# Patient Record
Sex: FEMALE | Race: WHITE | NOT HISPANIC OR LATINO | Employment: FULL TIME | URBAN - METROPOLITAN AREA
[De-identification: names, ages, dates, MRNs, and addresses within clinical notes are randomized per-mention and may not be internally consistent; named-entity substitution may affect disease eponyms.]

---

## 2022-09-30 ENCOUNTER — ANNUAL EXAM (OUTPATIENT)
Dept: OBGYN CLINIC | Facility: CLINIC | Age: 52
End: 2022-09-30

## 2022-09-30 VITALS
BODY MASS INDEX: 32.52 KG/M2 | HEIGHT: 65 IN | WEIGHT: 195.2 LBS | DIASTOLIC BLOOD PRESSURE: 68 MMHG | SYSTOLIC BLOOD PRESSURE: 108 MMHG

## 2022-09-30 DIAGNOSIS — Z12.31 ENCOUNTER FOR SCREENING MAMMOGRAM FOR MALIGNANT NEOPLASM OF BREAST: ICD-10-CM

## 2022-09-30 DIAGNOSIS — Z01.419 GYNECOLOGIC EXAM NORMAL: Primary | ICD-10-CM

## 2022-09-30 PROBLEM — N73.6 PELVIC PERITONEAL ADHESIONS, FEMALE: Status: ACTIVE | Noted: 2022-09-30

## 2022-09-30 PROBLEM — Z52.4 DONOR OF KIDNEY FOR TRANSPLANT: Status: ACTIVE | Noted: 2021-09-15

## 2022-09-30 PROBLEM — E03.4 HYPOTHYROIDISM DUE TO ACQUIRED ATROPHY OF THYROID: Status: ACTIVE | Noted: 2020-07-10

## 2022-09-30 PROBLEM — Z98.891 H/O CESAREAN SECTION: Status: ACTIVE | Noted: 2022-02-07

## 2022-09-30 PROBLEM — N94.9 SYMPTOM ASSOCIATED WITH FEMALE GENITAL ORGANS: Status: ACTIVE | Noted: 2022-09-30

## 2022-09-30 RX ORDER — CLONAZEPAM 0.5 MG/1
TABLET ORAL
COMMUNITY
Start: 2022-08-16

## 2022-09-30 RX ORDER — CITALOPRAM 20 MG/1
TABLET ORAL
COMMUNITY
Start: 2022-08-04

## 2022-09-30 RX ORDER — LEVOTHYROXINE SODIUM 50 MCG
TABLET ORAL
COMMUNITY
Start: 2022-08-26

## 2022-09-30 RX ORDER — CITALOPRAM 10 MG/1
TABLET ORAL
COMMUNITY
Start: 2022-07-26

## 2022-09-30 NOTE — PROGRESS NOTES
Assessment/Plan   Problem List Items Addressed This Visit        Other    Gynecologic exam normal - Primary     Pap guidelines reviewed  Pap not indicated in this low risk patient s/p benign hysterectomy  Continue yearly mammograms, script given  Return to office for annual or as needed  Other Visit Diagnoses     Encounter for screening mammogram for malignant neoplasm of breast        Relevant Orders    Mammo screening bilateral w 3d & cad          Subjective:     Patient ID: Violette Martinez is a 46 y o  y o  female  HPI  45 yo seen for annual exam  S/p hysterectomy 2014 for pelvic pain, had significant scar tissue from   Reports left ovary remains  Denies bowel or bladder issues  Last pap: Always normal    Last mammogram: 2021 BIRADS 1: Negative  The following portions of the patient's history were reviewed and updated as appropriate:   She  has a past medical history of Anxiety, Basal cell carcinoma (BCC) of skin of upper extremity including shoulder, Depression, Gastric polyps, and Hashimoto's disease  She   Patient Active Problem List    Diagnosis Date Noted    Pelvic peritoneal adhesions, female 2022    Symptom associated with female genital organs 2022    Gynecologic exam normal 2022    H/O  section 2022    Donor of kidney for transplant 09/15/2021    Hypothyroidism due to acquired atrophy of thyroid 07/10/2020     She  has a past surgical history that includes Hysterectomy; AUGMENTATION BREAST;  section; Excision basal cell carcinoma (Bilateral); Carpal tunnel release; Debridement tennis elbow; Nephrectomy living donor (Right, 2022); and Right oophorectomy (Right)  Her family history includes Breast cancer (age of onset: 52) in her sister; Coronary artery disease in her father; Diabetes in her paternal grandfather; Hyperlipidemia in her father; Hypertension in her father  She  reports that she has quit smoking   She has never used smokeless tobacco  She reports current alcohol use  She reports that she does not use drugs  Current Outpatient Medications   Medication Sig Dispense Refill    citalopram (CeleXA) 10 mg tablet TAKE 1 TABLET BY MOUTH EVERY DAY WITH 20MG      Synthroid 50 MCG tablet TAKE 1 TABLET BY MOUTH IN THE AM 5 DAYS A WEEK AND 2 TABS 2 DAYS A WEEK  **9 TABS TOTAL A WEEK**      citalopram (CeleXA) 20 mg tablet TAKE 1 TABLET BY MOUTH ONCE DAILY WITH 10MG TABLET      clonazePAM (KlonoPIN) 0 5 mg tablet TAKE 1 TABLET BY MOUTH EVERY DAY AS NEEDED FOR SLEEP       No current facility-administered medications for this visit  She is allergic to sulfamethoxazole, trimethoprim, amoxicillin, bactrim [sulfamethoxazole-trimethoprim], and penicillins       Menstrual History:  OB History        3    Para   2    Term   1            AB   1    Living   2       SAB        IAB        Ectopic        Multiple        Live Births   2                No LMP recorded (lmp unknown)  Patient has had a hysterectomy  Review of Systems   Constitutional: Negative for fatigue, fever and unexpected weight change  HENT: Negative for dental problem and sinus pressure  Eyes: Negative for visual disturbance  Respiratory: Negative for cough, shortness of breath and wheezing  Cardiovascular: Negative for chest pain  Gastrointestinal: Negative for abdominal pain, blood in stool, constipation, diarrhea, nausea and vomiting  Endocrine: Negative for polydipsia  Genitourinary: Negative for difficulty urinating, dyspareunia, dysuria, frequency, hematuria, pelvic pain and urgency  Musculoskeletal: Negative for arthralgias and back pain  Neurological: Negative for dizziness, seizures, light-headedness and headaches  Psychiatric/Behavioral: Negative for suicidal ideas  The patient is not nervous/anxious          Objective:  Vitals:    22 0757   BP: 108/68   BP Location: Left arm   Patient Position: Sitting Cuff Size: Standard   Weight: 88 5 kg (195 lb 3 2 oz)   Height: 5' 5 25" (1 657 m)      Physical Exam  Constitutional:       Appearance: Normal appearance  She is well-developed  Genitourinary:      Vulva and bladder normal       No lesions in the vagina  Right Labia: No rash, tenderness, lesions or skin changes  Left Labia: No tenderness, lesions, skin changes or rash  No labial fusion noted  No inguinal adenopathy present in the right or left side  No vaginal discharge, erythema, tenderness or bleeding  Right Adnexa: absent  Left Adnexa: not tender, not full and no mass present  Cervix is absent  Uterus is absent  No urethral prolapse, tenderness or mass present  Bladder is not tender  Breasts: Breasts are symmetrical       Right: No swelling, bleeding, inverted nipple, mass, nipple discharge, skin change, tenderness, axillary adenopathy or supraclavicular adenopathy  Left: No swelling, bleeding, inverted nipple, mass, nipple discharge, skin change, tenderness, axillary adenopathy or supraclavicular adenopathy  HENT:      Head: Normocephalic and atraumatic  Neck:      Thyroid: No thyromegaly  Cardiovascular:      Rate and Rhythm: Normal rate and regular rhythm  Heart sounds: Normal heart sounds  No murmur heard  No friction rub  No gallop  Pulmonary:      Effort: Pulmonary effort is normal  No respiratory distress  Breath sounds: Normal breath sounds  No wheezing  Abdominal:      General: There is no distension  Palpations: Abdomen is soft  There is no mass  Tenderness: There is no abdominal tenderness  There is no guarding or rebound  Hernia: No hernia is present  Lymphadenopathy:      Cervical: No cervical adenopathy  Upper Body:      Right upper body: No supraclavicular, axillary or pectoral adenopathy  Left upper body: No supraclavicular, axillary or pectoral adenopathy        Lower Body: No right inguinal adenopathy  No left inguinal adenopathy  Neurological:      Mental Status: She is alert and oriented to person, place, and time  Skin:     General: Skin is warm and dry     Psychiatric:         Behavior: Behavior normal

## 2022-10-05 NOTE — ASSESSMENT & PLAN NOTE
Pap guidelines reviewed  Pap not indicated in this low risk patient s/p benign hysterectomy  Continue yearly mammograms, script given  Return to office for annual or as needed 
Poor

## 2022-11-23 ENCOUNTER — HOSPITAL ENCOUNTER (OUTPATIENT)
Dept: MAMMOGRAPHY | Facility: IMAGING CENTER | Age: 52
Discharge: HOME | End: 2022-11-23

## 2022-11-23 VITALS — HEIGHT: 65 IN | BODY MASS INDEX: 32.49 KG/M2 | WEIGHT: 195 LBS

## 2022-11-23 DIAGNOSIS — Z12.31 ENCOUNTER FOR SCREENING MAMMOGRAM FOR MALIGNANT NEOPLASM OF BREAST: ICD-10-CM

## 2022-11-29 PROBLEM — Z01.419 GYNECOLOGIC EXAM NORMAL: Status: RESOLVED | Noted: 2022-09-30 | Resolved: 2022-11-29

## 2023-11-07 ENCOUNTER — HOSPITAL ENCOUNTER (OUTPATIENT)
Dept: MAMMOGRAPHY | Facility: IMAGING CENTER | Age: 53
Discharge: HOME | End: 2023-11-07
Payer: COMMERCIAL

## 2023-11-07 VITALS — HEIGHT: 65 IN | BODY MASS INDEX: 26.66 KG/M2 | WEIGHT: 160 LBS

## 2023-11-07 DIAGNOSIS — Z12.31 ENCOUNTER FOR SCREENING MAMMOGRAM FOR MALIGNANT NEOPLASM OF BREAST: ICD-10-CM

## 2023-11-07 PROCEDURE — 77063 BREAST TOMOSYNTHESIS BI: CPT

## 2023-11-07 PROCEDURE — 77067 SCR MAMMO BI INCL CAD: CPT

## 2023-11-14 ENCOUNTER — ANNUAL EXAM (OUTPATIENT)
Dept: OBGYN CLINIC | Facility: CLINIC | Age: 53
End: 2023-11-14
Payer: COMMERCIAL

## 2023-11-14 ENCOUNTER — TELEPHONE (OUTPATIENT)
Dept: ADMINISTRATIVE | Facility: OTHER | Age: 53
End: 2023-11-14

## 2023-11-14 VITALS
DIASTOLIC BLOOD PRESSURE: 76 MMHG | WEIGHT: 167.6 LBS | HEIGHT: 65 IN | BODY MASS INDEX: 27.92 KG/M2 | SYSTOLIC BLOOD PRESSURE: 108 MMHG

## 2023-11-14 DIAGNOSIS — Z12.31 ENCOUNTER FOR SCREENING MAMMOGRAM FOR MALIGNANT NEOPLASM OF BREAST: ICD-10-CM

## 2023-11-14 DIAGNOSIS — Z01.419 GYNECOLOGIC EXAM NORMAL: Primary | ICD-10-CM

## 2023-11-14 PROBLEM — E07.9 DISEASE OF THYROID GLAND: Status: ACTIVE | Noted: 2023-11-14

## 2023-11-14 PROBLEM — R12 HEARTBURN: Status: ACTIVE | Noted: 2023-01-24

## 2023-11-14 PROBLEM — Z90.5 H/O RIGHT NEPHRECTOMY: Status: ACTIVE | Noted: 2023-01-24

## 2023-11-14 PROBLEM — L29.0 PERIANAL IRRITATION: Status: ACTIVE | Noted: 2023-01-24

## 2023-11-14 PROBLEM — K21.9 GERD (GASTROESOPHAGEAL REFLUX DISEASE): Status: ACTIVE | Noted: 2023-11-14

## 2023-11-14 PROCEDURE — 99396 PREV VISIT EST AGE 40-64: CPT | Performed by: PHYSICIAN ASSISTANT

## 2023-11-14 NOTE — TELEPHONE ENCOUNTER
----- Message from Ravi Adair PA-C sent at 11/14/2023  8:52 AM EST -----  Regarding: colonoscopy  11/14/23 8:52 AM     Hello, our patient Jonathan Perez had a Colonoscopy  completed/performed. Please assist in updating the patient chart by making an External outreach to [facility], done by Dr. Todd Ann.       Thank you,   Ravi Adair PA-C   [address] [place] [address] OB/GYN [facility]

## 2023-11-14 NOTE — LETTER
DATE OF PROCEDURE:  01/26/2019    SURGEON:  Antonino Flores M.D.    PREOPERATIVE DIAGNOSES:  The patient is a 42-year-old female with iron  deficiency anemia on iron replacement therapy.  She does have intermittent  menorrhagia.  She has constipation with a bowel movement every 2 days and  presents for colorectal cancer screening.     POSTOPERATIVE DIAGNOSES:    1. A 4 mm flat mucosal distal transverse colon polyp, status post cold biopsy  polypectomy.   2. A 3 mm sessile rectal polyp, status post cold biopsy polypectomy.  3. A few scattered diverticula within the sigmoid colon.  4. Grade 1 internal hemorrhoids.  5. Poor colonic prep precluded more thorough examination.  6. Otherwise, unremarkable colonoscopy of the visualized segments to the  terminal ileum.     PROCEDURE PERFORMED:  Colonoscopy with biopsies.    INSTRUMENT USED:  e-channel video colonoscope.    SEDATION:  Per Anesthesia.    DESCRIPTION OF PROCEDURE:  Informed consent had been obtained prior to the  procedure with the patient was aware of potential complications of bleeding,  perforation, aspiration, and infection.  Continuous pulse oximetry and blood  pressure monitoring were obtained throughout the course of the procedure.  The  patient was brought to the endoscopy suite and after her EGD was completed, was  placed in the left lateral decubitus position.  After conscious sedation had  been administered by Anesthesia, video colonoscope was inserted rectally and  under direct visualization, was advanced to the cecum without difficulty.  The  cecum was identified by the ileocecal valve, cecal strap, transillumination,  and the appendiceal orifice.  The ileocecal valve was then intubated and  colonoscope was advanced approximately 5 cm into the terminal ileum.  The  terminal ileal um mucosa was unremarkable colonoscopically and the instrument  was then withdrawn back into the cecum whereupon it was slowly withdrawn with  careful inspection of the  Procedure Request Form: Colonoscopy      Date Requested: 23  Patient: Bradly Huizar  Patient : 1970   Referring Provider: Kishor Mojica, DO        Date of Procedure ______________________________       The above patient has informed us that they have completed their   most recent Colonoscopy at your facility. Please complete   this form and attach all corresponding procedure reports/results. Comments __________________________________________________________  ____________________________________________________________________  ____________________________________________________________________  ____________________________________________________________________    Facility Completing Procedure _________________________________________    Form Completed By (print name) _______________________________________      Signature __________________________________________________________      These reports are needed for  compliance. Please fax this completed form and a copy of the procedure report to our office located at [address] [place] [address] as soon as possible to Fax [phone] camilo Torres: Phone [phone]    We thank you for your assistance in treating our mutual patient. individual colonic segments.  A 4 mm flat mucosal  distal transverse colon polyp was identified and a 3 mm sessile rectal polyp  was identified.  Both polyps were excised using cold biopsy forceps with  adequate hemostasis post polypectomies.  A few scattered small diverticula were  noted within the sigmoid colon.  Retroflexed examination within the rectum  revealed grade 1 internal hemorrhoids.  The overall quality of the colonic prep  was poor due to a large amount of liquid and semisolid stool throughout the  colon.  The instruments were then withdrawn from the patient who tolerated the  procedure and there were no apparent complications.  Postoperatively, the  patient had active bowel sounds, soft abdomen, flatus, normal vital signs.  The  estimated time withdrawal was 12 minutes.     IMPRESSION:  The patient was found to have 2 colon polyps, which were excised  as detailed above.  Additionally, she had diverticulosis and internal  hemorrhoids.  A poor colonic prep precluded a more thorough examination.     RECOMMENDATIONS:    1. Follow up polypectomy results.  2. Recommend a followup colonoscopy in 1 year with a 2-day bowel preparation  given an inadequate bowel preparation noted today.   3. Diverticulosis diet and precautions.  4. High-fiber diet with adequate daily hydration.  5. Refer to EGD recommendations.  6. Follow up in the office within 2 weeks or sooner as necessary.        DATE AND TIME SANDRA Hernandez/CLARA-#[id number]  DD:  01/26/2019 08:44:59      DT:  01/26/2019 10:27:46    cc:           Fei Yanez M.D.        [facility]                      MRN#: [id number]  ROOM:      [facility]#: [id number]  REPORT OF OPERATION

## 2023-11-14 NOTE — TELEPHONE ENCOUNTER
Upon review of the In Basket request we were able to locate, review, and update the patient chart as requested for CRC: Colonoscopy. Any additional questions or concerns should be emailed to the Practice Liaisons via the appropriate education email address, please do not reply via In Basket. Thank you  Abram Milton MA       Upon review of the In Basket request and the patient's chart, initial outreach has been made via fax to facility. Please see Contacts section for details.      Thank you  Abram Milton MA

## 2023-11-14 NOTE — PATIENT INSTRUCTIONS
There are some other supplements to help with night sweats and hot flashes including Evening Primrose oil, Black Cohosh, Vitamin E. Unfortunately there are limited studies on these supplements and the studies we do have show that there is no benefit when compared to placebo group but I do have patient's there swear by them. We have certainly found that dietary modifications such as avoiding caffeine, alcohol, spicy foods can help decrease intensity of hot flashes and are often triggers for hot flashes and night sweats. Some women also find that increasing plant based estrogens in their diet such as isoflavones (soy, chickpeas, lentils, flaxseed, grains, beans, fruits and vegetables) can help as well. Weight loss can reduce symptoms too.

## 2023-11-14 NOTE — ASSESSMENT & PLAN NOTE
Pap guidelines reviewed. Pap no longer indicated in this patient s/p benign hysterectomy. Continue yearly mammograms. Will request colonoscopy records. Follows with PCP for routine labs. There are some other supplements to help with night sweats and hot flashes including Evening Primrose oil, Black Cohosh, Vitamin E. Unfortunately there are limited studies on these supplements and the studies we do have show that there is no benefit when compared to placebo group. Reviewed dietary modifications such as avoiding caffeine, alcohol, spicy foods can help decrease intensity of hot flashes and are often triggers for hot flashes and night sweats. Some women also find that increasing plant based estrogens in their diet such as isoflavones (soy, chickpeas, lentils, flaxseed, grains, beans, fruits and vegetables) can help as well. Weight loss can reduce symptoms too. Recommend Calcium 1200mg in two divided doses. Vitamin D3 2,000-4,000 IU daily   Weight bearing exercises 3-4x's a week for 30-40min. Return to office for annual or as needed.

## 2023-11-14 NOTE — PROGRESS NOTES
Assessment/Plan   Problem List Items Addressed This Visit          Other    Gynecologic exam normal - Primary     Pap guidelines reviewed. Pap no longer indicated in this patient s/p benign hysterectomy. Continue yearly mammograms. Will request colonoscopy records. Follows with PCP for routine labs. There are some other supplements to help with night sweats and hot flashes including Evening Primrose oil, Black Cohosh, Vitamin E. Unfortunately there are limited studies on these supplements and the studies we do have show that there is no benefit when compared to placebo group. Reviewed dietary modifications such as avoiding caffeine, alcohol, spicy foods can help decrease intensity of hot flashes and are often triggers for hot flashes and night sweats. Some women also find that increasing plant based estrogens in their diet such as isoflavones (soy, chickpeas, lentils, flaxseed, grains, beans, fruits and vegetables) can help as well. Weight loss can reduce symptoms too. Recommend Calcium 1200mg in two divided doses. Vitamin D3 2,000-4,000 IU daily   Weight bearing exercises 3-4x's a week for 30-40min. Return to office for annual or as needed. Other Visit Diagnoses       Encounter for screening mammogram for malignant neoplasm of breast        Relevant Orders    Mammo screening bilateral w 3d & cad            Subjective:     Patient ID: Pranav Hughes is a 48 y.o. y.o. female. HPI  47 yo seen for annual exam. S/p benign hysterectomy 12/2014 for pelvic pain. Left ovary remains. Denies bowel or bladder issues. Occasional hot flashes. Tolerable. Last pap: Always normal.   Last mammogram: 11/7/2023 results pending. Last colonoscopy: Reports up to date was done by Dr. Ti Ibrahim at [facility]. Will request records.    The following portions of the patient's history were reviewed and updated as appropriate: She  has a past medical history of Anxiety, Basal cell carcinoma (BCC) of skin of upper extremity including shoulder, Cancer ([address]) (2014), Depression, Gastric polyps, Hashimoto's disease, and Hyperthyroidism (2019). She   Patient Active Problem List    Diagnosis Date Noted    Disease of thyroid gland 2023    GERD (gastroesophageal reflux disease) 2023    Gynecologic exam normal 2023    H/O right nephrectomy 2023    Heartburn 2023    Perianal irritation 2023    Pelvic peritoneal adhesions, female 2022    Symptom associated with female genital organs 2022    H/O  section 2022    Donor of kidney for transplant 09/15/2021    Hypothyroidism due to acquired atrophy of thyroid 07/10/2020     She  has a past surgical history that includes Hysterectomy; AUGMENTATION BREAST;  section; Excision basal cell carcinoma (Bilateral); Carpal tunnel release; Debridement tennis elbow; Nephrectomy living donor (Right, 2022); Oophorectomy (Left, ); and Augmentation mammaplasty. Her family history includes Asthma in her mother; Breast cancer (age of onset: 52) in her sister; Cancer in her mother; Coronary artery disease in her father; Diabetes in her paternal grandfather; Heart disease in her maternal grandfather, maternal grandmother, mother, paternal grandfather, and paternal grandmother; Hyperlipidemia in her father and mother; Hypertension in her father; No Known Problems in her daughter and sister; Prostate cancer (age of onset: 72) in her father; Thyroid disease in her mother. She  reports that she quit smoking about 7 years ago. Her smoking use included cigarettes. She has a 10.00 pack-year smoking history. She has never used smokeless tobacco. She reports current alcohol use. She reports that she does not currently use drugs.   Current Outpatient Medications   Medication Sig Dispense Refill    citalopram (CeleXA) 20 mg tablet TAKE 1 TABLET BY MOUTH ONCE DAILY WITH 10MG TABLET      clonazePAM (KlonoPIN) 0.5 mg tablet TAKE 1 TABLET BY MOUTH EVERY DAY AS NEEDED FOR SLEEP      Synthroid 50 MCG tablet TAKE 1 TABLET BY MOUTH IN THE AM 5 DAYS A WEEK AND 2 TABS 2 DAYS A WEEK. **9 TABS TOTAL A WEEK**      citalopram (CeleXA) 10 mg tablet TAKE 1 TABLET BY MOUTH EVERY DAY WITH 20MG       No current facility-administered medications for this visit. She is allergic to sulfamethoxazole, trimethoprim, amoxicillin, bactrim [sulfamethoxazole-trimethoprim], and penicillins. .    Menstrual History:  OB History          4    Para   3    Term   2            AB   1    Living   1         SAB        IAB        Ectopic        Multiple        Live Births   2                  No LMP recorded (lmp unknown). Patient has had a hysterectomy. Review of Systems   Constitutional:  Negative for fatigue, fever and unexpected weight change. HENT:  Negative for dental problem and sinus pressure. Eyes:  Negative for visual disturbance. Respiratory:  Negative for cough, shortness of breath and wheezing. Cardiovascular:  Negative for chest pain. Gastrointestinal:  Negative for abdominal pain, blood in stool, constipation, diarrhea, nausea and vomiting. Endocrine: Negative for polydipsia. Genitourinary:  Negative for difficulty urinating, dyspareunia, dysuria, frequency, hematuria, pelvic pain and urgency. Musculoskeletal:  Negative for arthralgias and back pain. Neurological:  Negative for dizziness, seizures, light-headedness and headaches. Psychiatric/Behavioral:  Negative for suicidal ideas. The patient is not nervous/anxious. Objective:  Vitals:    23 0823   BP: 108/76   BP Location: Left arm   Patient Position: Sitting   Cuff Size: Standard   Weight: 76 kg (167 lb 9.6 oz)   Height: 5' 5.25" (1.657 m)      Physical Exam  Constitutional:       Appearance: Normal appearance. She is well-developed. Genitourinary:      Vulva and bladder normal.      No lesions in the vagina.       Right Labia: No rash, tenderness, lesions or skin changes. Left Labia: No tenderness, lesions, skin changes or rash. No labial fusion noted. No inguinal adenopathy present in the right or left side. No vaginal discharge, erythema, tenderness or bleeding. Right Adnexa: absent. Left Adnexa: not tender, not full and no mass present. Cervix is absent. Uterus is absent. No urethral prolapse, tenderness or mass present. Bladder is not tender. Breasts:     Breasts are symmetrical.      Right: No swelling, bleeding, inverted nipple, mass, nipple discharge, skin change or tenderness. Left: No swelling, bleeding, inverted nipple, mass, nipple discharge, skin change or tenderness. HENT:      Head: Normocephalic and atraumatic. Neck:      Thyroid: No thyromegaly. Cardiovascular:      Rate and Rhythm: Normal rate and regular rhythm. Heart sounds: Normal heart sounds. No murmur heard. No friction rub. No gallop. Pulmonary:      Effort: Pulmonary effort is normal. No respiratory distress. Breath sounds: Normal breath sounds. No wheezing. Abdominal:      General: There is no distension. Palpations: Abdomen is soft. There is no mass. Tenderness: There is no abdominal tenderness. There is no guarding or rebound. Hernia: No hernia is present. Lymphadenopathy:      Cervical: No cervical adenopathy. Upper Body:      Right upper body: No supraclavicular, axillary or pectoral adenopathy. Left upper body: No supraclavicular, axillary or pectoral adenopathy. Lower Body: No right inguinal adenopathy. No left inguinal adenopathy. Neurological:      Mental Status: She is alert and oriented to person, place, and time. Skin:     General: Skin is warm and dry.    Psychiatric:         Behavior: Behavior normal.

## 2024-01-13 PROBLEM — Z01.419 GYNECOLOGIC EXAM NORMAL: Status: RESOLVED | Noted: 2023-11-14 | Resolved: 2024-01-13

## 2024-06-03 LAB — HBA1C MFR BLD HPLC: 5.5 %

## 2024-06-19 DIAGNOSIS — Z00.6 ENCOUNTER FOR EXAMINATION FOR NORMAL COMPARISON OR CONTROL IN CLINICAL RESEARCH PROGRAM: ICD-10-CM

## 2024-06-20 ENCOUNTER — APPOINTMENT (OUTPATIENT)
Dept: LAB | Facility: HOSPITAL | Age: 54
End: 2024-06-20

## 2024-06-20 DIAGNOSIS — Z00.6 ENCOUNTER FOR EXAMINATION FOR NORMAL COMPARISON OR CONTROL IN CLINICAL RESEARCH PROGRAM: ICD-10-CM

## 2024-06-20 PROCEDURE — 36415 COLL VENOUS BLD VENIPUNCTURE: CPT

## 2024-07-03 LAB
APOB+LDLR+PCSK9 GENE MUT ANL BLD/T: NOT DETECTED
BRCA1+BRCA2 DEL+DUP + FULL MUT ANL BLD/T: NOT DETECTED
MLH1+MSH2+MSH6+PMS2 GN DEL+DUP+FUL M: NOT DETECTED

## 2024-09-16 ENCOUNTER — HOSPITAL ENCOUNTER (OUTPATIENT)
Dept: MAMMOGRAPHY | Facility: IMAGING CENTER | Age: 54
Discharge: HOME | End: 2024-09-16
Payer: COMMERCIAL

## 2024-09-16 VITALS — WEIGHT: 164 LBS | BODY MASS INDEX: 27.32 KG/M2 | HEIGHT: 65 IN

## 2024-09-16 DIAGNOSIS — Z12.31 ENCOUNTER FOR SCREENING MAMMOGRAM FOR MALIGNANT NEOPLASM OF BREAST: ICD-10-CM

## 2024-09-16 PROCEDURE — 77063 BREAST TOMOSYNTHESIS BI: CPT

## 2024-09-16 PROCEDURE — 77067 SCR MAMMO BI INCL CAD: CPT

## 2024-09-30 ENCOUNTER — CONSULT (OUTPATIENT)
Dept: NEPHROLOGY | Facility: HOSPITAL | Age: 54
End: 2024-09-30
Payer: COMMERCIAL

## 2024-09-30 VITALS
BODY MASS INDEX: 28.82 KG/M2 | SYSTOLIC BLOOD PRESSURE: 110 MMHG | WEIGHT: 173 LBS | HEART RATE: 65 BPM | HEIGHT: 65 IN | DIASTOLIC BLOOD PRESSURE: 74 MMHG

## 2024-09-30 DIAGNOSIS — E55.9 VITAMIN D DEFICIENCY: ICD-10-CM

## 2024-09-30 DIAGNOSIS — Z90.5 H/O RIGHT NEPHRECTOMY: Primary | ICD-10-CM

## 2024-09-30 PROCEDURE — 99204 OFFICE O/P NEW MOD 45 MIN: CPT | Performed by: INTERNAL MEDICINE

## 2024-09-30 RX ORDER — BUPROPION HYDROCHLORIDE 300 MG/1
300 TABLET ORAL DAILY
COMMUNITY

## 2024-09-30 NOTE — PROGRESS NOTES
NEPHROLOGY OUTPATIENT CONSULTATION   Elvie Mederos 54 y.o. female MRN: [id number]  Date: 9/30/2024  Reason for consultation:   Chief Complaint   Patient presents with    Chronic Kidney Disease    Consult       Patient instructions:  Patient Instructions   History of right nephrectomy in setting of being kidney donor with possible CKD stage 3a  -did have elevated sCr up to 1.67 2 days post op after nephrectomy likely d/t hyperfiltration  -sCr has been elevated with last sCr 1.36 as of 6/3/24 with eGFR 46ml/min  -since nephrectomy, b/l sCr 1.2-1.4  -need to obtain repeat labs to determine if sCr at new baseline  -remain well hydrated  -will obtain renal ultrasound to ensure solitary acquired kidney stable  -cystatin C 1.36 as of 2/1/23 suggestive of better renal function.   -will repeat cystatin C along with BMP, UA, UpCr and microalb:Cr  -avoid NSAIDs such as ibuprofen, aleve, advil, motrin, naproxen, celebrex, indomethacin, toradol  -last UA on file 12/2023 +leukocyte esterase, otherwise bland  -in office urine sample bland  Vitamin D deficiency - vitamin D level 22 as of 6/3/24, takes 5000u vitamin D every other day    RTC in 4 months pending bloodwork results. If sCr stable, may defer follow up.         Elvie was seen today for chronic kidney disease and consult.    Diagnoses and all orders for this visit:    H/O right nephrectomy  -     Basic metabolic panel; Future  -     Urinalysis with microscopic; Future  -     Cystatin C With eGFR; Future  -     US kidney and bladder with pvr; Future  -     Protein, urine, random; Future  -     Microalbumin,Urine; Future  -     Creatinine, urine, random; Future  -     Basic metabolic panel  -     Urinalysis with microscopic  -     Cystatin C With eGFR  -     Protein, urine, random  -     Microalbumin,Urine  -     Creatinine, urine, random    Vitamin D deficiency        ASSESSMENT and PLAN:  History of right nephrectomy in setting of being kidney donor with possible CKD stage  3a  -did have elevated sCr up to 1.67 2 days post op after nephrectomy likely d/t hyperfiltration  -sCr has been elevated with last sCr 1.36 as of 6/3/24 with eGFR 46ml/min  -since nephrectomy, b/l sCr 1.2-1.4  -need to obtain repeat labs to determine if sCr at new baseline  -remain well hydrated  -will obtain renal ultrasound to ensure solitary acquired kidney stable  -cystatin C 1.36 as of 23 suggestive of better renal function.   -will repeat cystatin C along with BMP, UA, UpCr and microalb:Cr  -avoid NSAIDs such as ibuprofen, aleve, advil, motrin, naproxen, celebrex, indomethacin, toradol  -last UA on file 2023 +leukocyte esterase, otherwise bland  -in office urine sample bland  Vitamin D deficiency - vitamin D level 22 as of 6/3/24, takes 5000u vitamin D every other day    RTC in 4 months pending bloodwork results. If sCr stable, may defer follow up.     HISTORY OF PRESENT ILLNESS:  Requesting Physician: DO Elvie Myers is a 54 y.o. female with history of right nephrectomy as donor of kidney for transplant who presents in consultation for declining renal function.    Denies history of recent NSAID use, herbal/OTC medications, history of recurrent UTIs or nephrolithiasis. Denies history of prior known kidney disease.     Works as a  now. Her son  in in MVA. He told her to donate her kidney. She had donated in 2022.     PAST MEDICAL HISTORY:  Past Medical History:   Diagnosis Date    Anxiety     Basal cell carcinoma (BCC) of skin of upper extremity including shoulder     BRCA1 negative     BRCA2 negative     Cancer (HCC) 2014    Basal cell    Depression     Gastric polyps     Hashimoto's disease     Hyperthyroidism 2019       PAST SURGICAL HISTORY:  Past Surgical History:   Procedure Laterality Date    AUGMENTATION BREAST      AUGMENTATION MAMMAPLASTY      BASAL CELL CARCINOMA EXCISION Bilateral     shoulders    CARPAL TUNNEL RELEASE       SECTION      x2     DEBRIDEMENT TENNIS ELBOW      HYSTERECTOMY      Left Salpingectomy 2014    NEPHRECTOMY LIVING DONOR Right 2022    OOPHORECTOMY Left        ALLERGIES:  Allergies   Allergen Reactions    Sulfamethoxazole Other (See Comments)    Trimethoprim Other (See Comments)    Amoxicillin Rash    Bactrim [Sulfamethoxazole-Trimethoprim] Rash    Penicillins Rash and Other (See Comments)     Other reaction(s): yeast infections  Tolerates Keflex         SOCIAL HISTORY:  Social History     Substance and Sexual Activity   Alcohol Use Not Currently    Comment: ocassionally     Social History     Substance and Sexual Activity   Drug Use Not Currently     Social History     Tobacco Use   Smoking Status Former    Current packs/day: 0.00    Average packs/day: 0.5 packs/day for 20.0 years (10.0 ttl pk-yrs)    Types: Cigarettes    Start date: 1996    Quit date: 2016    Years since quittin.7   Smokeless Tobacco Never       FAMILY HISTORY:  Family History   Problem Relation Age of Onset    Asthma Mother         Svitlana Kwan    Cancer Mother         Prostate    Heart disease Mother     Hyperlipidemia Mother     Thyroid disease Mother     Dementia Mother     Coronary artery disease Father     Hypertension Father     Hyperlipidemia Father     Prostate cancer Father 65    Cancer Father         Prostate    Breast cancer Sister 49    No Known Problems Sister     Heart disease Maternal Grandmother     Heart disease Maternal Grandfather     Heart disease Paternal Grandmother     Diabetes Paternal Grandfather     Heart disease Paternal Grandfather     No Known Problems Daughter        MEDICATIONS:    Current Outpatient Medications:     buPROPion (WELLBUTRIN XL) 300 mg 24 hr tablet, Take 300 mg by mouth daily, Disp: , Rfl:     citalopram (CeleXA) 10 mg tablet, TAKE 1 TABLET BY MOUTH EVERY DAY WITH 20MG, Disp: , Rfl:     citalopram (CeleXA) 20 mg tablet, TAKE 1 TABLET BY MOUTH ONCE DAILY WITH 10MG TABLET, Disp: , Rfl:      "clonazePAM (KlonoPIN) 0.5 mg tablet, TAKE 1 TABLET BY MOUTH EVERY DAY AS NEEDED FOR SLEEP, Disp: , Rfl:     Synthroid 50 MCG tablet, TAKE 1 TABLET BY MOUTH IN THE AM 5 DAYS A WEEK AND 2 TABS 2 DAYS A WEEK. **9 TABS TOTAL A WEEK**, Disp: , Rfl:     REVIEW OF SYSTEMS:  Review of Systems   Constitutional:  Negative for chills and fever.   HENT:  Negative for sore throat and trouble swallowing.    Eyes:  Negative for visual disturbance.   Respiratory:  Negative for cough and shortness of breath.    Cardiovascular:  Negative for chest pain and leg swelling.   Gastrointestinal:  Negative for abdominal pain, constipation, diarrhea, nausea and vomiting.   Endocrine: Negative for polyuria.   Genitourinary:  Negative for difficulty urinating, dysuria and hematuria.   Musculoskeletal:  Positive for back pain (low back pain from job). Negative for neck pain.        Left arm sore   Skin:  Negative for rash.   Neurological:  Negative for dizziness, light-headedness and numbness.   Psychiatric/Behavioral:  The patient is not nervous/anxious.        PHYSICAL EXAM:   Vitals:    09/30/24 1057   BP: 110/74   BP Location: Left arm   Patient Position: Sitting   Cuff Size: Standard   Pulse: 65   Weight: 78.5 kg (173 lb)   Height: 5' 5\" (1.651 m)     Body mass index is 28.79 kg/m².    Physical Exam  Vitals and nursing note reviewed.   Constitutional:       General: She is not in acute distress.     Appearance: Normal appearance. She is well-developed. She is not diaphoretic.   HENT:      Head: Normocephalic and atraumatic.      Nose: Nose normal.      Mouth/Throat:      Mouth: Mucous membranes are moist.      Pharynx: No oropharyngeal exudate.   Eyes:      General: No scleral icterus.        Right eye: No discharge.         Left eye: No discharge.   Neck:      Thyroid: No thyromegaly.   Cardiovascular:      Rate and Rhythm: Normal rate and regular rhythm.      Heart sounds: No murmur heard.  Pulmonary:      Effort: Pulmonary effort is " "normal. No respiratory distress.      Breath sounds: Normal breath sounds. No wheezing.   Abdominal:      General: Bowel sounds are normal. There is no distension.      Palpations: Abdomen is soft.   Musculoskeletal:         General: No swelling. Normal range of motion.      Cervical back: Normal range of motion and neck supple.   Skin:     General: Skin is warm and dry.      Coloration: Skin is not jaundiced.      Findings: No rash.   Neurological:      General: No focal deficit present.      Mental Status: She is alert.      Motor: No abnormal muscle tone.      Comments: awake   Psychiatric:         Mood and Affect: Mood normal.         Behavior: Behavior normal.           Laboratory results:   Lab Results   Component Value Date    SODIUM 136 03/07/2022    K 5.1 03/07/2022    CL 99 (L) 03/07/2022    CO2 28 03/07/2022    BUN 20 03/07/2022    CREATININE 1.58 (H) 03/07/2022    GLUC 85 03/07/2022    CALCIUM 9.5 03/07/2022        Imaging: 10/29/21 CT abdomen: no hydronephrosis/mass/stone, 3 renal arteries on left, single renal artery on right    Portions of the record may have been created with voice recognition software.  Occasional wrong word or \"sound a like\" substitutions may have occurred due to the inherent limitations of voice recognition software.  Read the chart carefully and recognize, using context, where substitutions have occurred.  "

## 2024-09-30 NOTE — PATIENT INSTRUCTIONS
History of right nephrectomy in setting of being kidney donor with possible CKD stage 3a  -did have elevated sCr up to 1.67 2 days post op after nephrectomy likely d/t hyperfiltration  -sCr has been elevated with last sCr 1.36 as of 6/3/24 with eGFR 46ml/min  -since nephrectomy, b/l sCr 1.2-1.4  -need to obtain repeat labs to determine if sCr at new baseline  -remain well hydrated  -will obtain renal ultrasound to ensure solitary acquired kidney stable  -cystatin C 1.36 as of 2/1/23 suggestive of better renal function.   -will repeat cystatin C along with BMP, UA, UpCr and microalb:Cr  -avoid NSAIDs such as ibuprofen, aleve, advil, motrin, naproxen, celebrex, indomethacin, toradol  -last UA on file 12/2023 +leukocyte esterase, otherwise bland  -in office urine sample bland  Vitamin D deficiency - vitamin D level 22 as of 6/3/24, takes 5000u vitamin D every other day    RTC in 4 months pending bloodwork results. If sCr stable, may defer follow up.

## 2024-10-15 LAB
APPEARANCE UR: CLEAR
BACTERIA UR QL AUTO: ABNORMAL /HPF
BILIRUB UR QL STRIP: NEGATIVE
BUN SERPL-MCNC: 20 MG/DL (ref 7–25)
BUN/CREAT SERPL: 12 (CALC) (ref 6–22)
CALCIUM SERPL-MCNC: 9.3 MG/DL (ref 8.6–10.4)
CHLORIDE SERPL-SCNC: 102 MMOL/L (ref 98–110)
CO2 SERPL-SCNC: 30 MMOL/L (ref 20–32)
COLOR UR: YELLOW
CREAT SERPL-MCNC: 1.61 MG/DL (ref 0.5–1.03)
CREAT UR-MCNC: 32 MG/DL (ref 20–275)
CYSTATIN C SERPL-MCNC: 1.53 MG/L (ref 0.52–1.17)
GFR/BSA.PRED SERPLBLD CYS-BASED-ARV: 38 ML/MIN/1.73M2
GFR/BSA.PRED SERPLBLD CYS-BASED-ARV: 42 ML/MIN/1.73M2
GLUCOSE SERPL-MCNC: 82 MG/DL (ref 65–99)
GLUCOSE UR QL STRIP: NEGATIVE
HGB UR QL STRIP: NEGATIVE
HYALINE CASTS #/AREA URNS LPF: ABNORMAL /LPF
KETONES UR QL STRIP: NEGATIVE
LEUKOCYTE ESTERASE UR QL STRIP: ABNORMAL
MICROALBUMIN UR-MCNC: <0.2 MG/DL
NITRITE UR QL STRIP: NEGATIVE
PH UR STRIP: 6 [PH] (ref 5–8)
POTASSIUM SERPL-SCNC: 4.6 MMOL/L (ref 3.5–5.3)
PROT UR QL STRIP: NEGATIVE
PROT UR-MCNC: <4 MG/DL (ref 5–24)
RBC #/AREA URNS HPF: ABNORMAL /HPF
SODIUM SERPL-SCNC: 137 MMOL/L (ref 135–146)
SP GR UR STRIP: 1 (ref 1–1.03)
SQUAMOUS #/AREA URNS HPF: ABNORMAL /HPF
WBC #/AREA URNS HPF: ABNORMAL /HPF

## 2024-10-29 ENCOUNTER — HOSPITAL ENCOUNTER (OUTPATIENT)
Dept: ULTRASOUND IMAGING | Facility: HOSPITAL | Age: 54
Discharge: HOME | End: 2024-10-29
Attending: INTERNAL MEDICINE
Payer: COMMERCIAL

## 2024-10-29 DIAGNOSIS — Z90.5 H/O RIGHT NEPHRECTOMY: ICD-10-CM

## 2024-10-29 PROCEDURE — 76775 US EXAM ABDO BACK WALL LIM: CPT

## 2024-11-25 ENCOUNTER — NURSE TRIAGE (OUTPATIENT)
Age: 54
End: 2024-11-25

## 2024-11-25 NOTE — TELEPHONE ENCOUNTER
Regarding: f/u ER 1/24 with ovarian cyst and pain in L side  ----- Message from Vanessa SALDANA sent at 11/25/2024  8:16 AM EST -----  Patient was in [facility] ER 1/24 with pain and CT showed 6 cm ovarian cyst. She has pain on her L side and is looking for recommendations.

## 2024-11-25 NOTE — TELEPHONE ENCOUNTER
" Patient is calling in, she reports she was in the ED yesterday at [facility] for left sided abdominal pain. The pain started on 11/14. She states while in the ED they did a CT scan that showed a 6 CM ovarian cyst. However they told her the pain was most likely musculoskeletal. She states the pain is constant and a 6-7/10. She states the pain is worse with movement or coughing. She is taking Tylenol with little relief. She can't take Motrin due to having 1 kidney. She denies any vomiting/diarrhea, vaginal bleeding or fevers. She has intermittent nausea and the sweats. She states she has a partial hysterectomy and one ovary removed. She mentioned wanted this ovary removed if possible. Appt made for 11/26. Discussed if pain becomes severe or has vomiting/fevers she is to report to the ED. She has her ED records that she will bring to her appt tomorrow        Reason for Disposition  • MODERATE pain (e.g., interferes with normal activities that comes and goes (cramps) lasts > 24 hours  (Exception: Pain with Vomiting or Diarrhea - see that Protocol.)    Answer Assessment - Initial Assessment Questions  1. LOCATION: \"Where does it hurt?\"       Near ribcage  2. RADIATION: \"Does the pain shoot anywhere else?\" (e.g., chest, back)      Denies   3. ONSET: \"When did the pain begin?\" (e.g., minutes, hours or days ago)       Started on 14th, went to ED on 11/4  5. PATTERN \"Does the pain come and go, or is it constant?\"      Constant  6. SEVERITY: \"How bad is the pain?\"  (e.g., Scale 1-10; mild, moderate, or severe)      7-8/10, tylenol is not helping   8. CAUSE: \"What do you think is causing the stomach pain?\"      ED told her it was a muscle pull  9. RELIEVING/AGGRAVATING FACTORS: \"What makes it better or worse?\" (e.g., antacids, bending or twisting motion, bowel movement)      Movement makes it worse   10. OTHER SYMPTOMS: \"Do you have any other symptoms?\" (e.g., back pain, diarrhea, fever, urination pain, vomiting)        Felt " ill with nausea off/on sweats on and off    Protocols used: Abdominal Pain - Female-Adult-[place]

## 2024-11-26 ENCOUNTER — OFFICE VISIT (OUTPATIENT)
Dept: OBGYN CLINIC | Facility: CLINIC | Age: 54
End: 2024-11-26
Payer: COMMERCIAL

## 2024-11-26 VITALS
HEIGHT: 65 IN | BODY MASS INDEX: 28.66 KG/M2 | DIASTOLIC BLOOD PRESSURE: 64 MMHG | SYSTOLIC BLOOD PRESSURE: 110 MMHG | WEIGHT: 172 LBS

## 2024-11-26 DIAGNOSIS — N83.292 OTHER OVARIAN CYST, LEFT SIDE: Primary | ICD-10-CM

## 2024-11-26 PROCEDURE — 99214 OFFICE O/P EST MOD 30 MIN: CPT | Performed by: OBSTETRICS & GYNECOLOGY

## 2024-11-26 RX ORDER — LIDOCAINE 50 MG/G
PATCH TOPICAL DAILY
COMMUNITY
Start: 2024-11-24

## 2024-11-26 RX ORDER — CYCLOBENZAPRINE HCL 10 MG
TABLET ORAL
COMMUNITY
Start: 2024-11-19

## 2024-11-26 NOTE — ASSESSMENT & PLAN NOTE
53 yo  s/p hysterectomy, RSO and left salpingectomy here urgently in follow up to [facility] ER visit of 24 for left flank pain.   Incidental finding on CT of 6 cm left adnexal cyst.   Pain is left flank, no urinary/bowel symptoms.   Benign abdominal and pelvic exams.     Will check u/s to characterize cyst.   Discusses pain management with PCP, possibly Dr Cerda for pain most likely MSK in origin.   Doubt gyn etiology of pain.   Complicated pelvis with multiple surgeries, possibility of inclusion cyst discussed.   Will contact with results.   RTO 6 wks.

## 2024-11-26 NOTE — PROGRESS NOTES
Assessment/Plan:    Other ovarian cyst, left side  55 yo  s/p hysterectomy, RSO and left salpingectomy here urgently in follow up to [facility] ER visit of 24 for left flank pain.   Incidental finding on CT of 6 cm left adnexal cyst.   Pain is left flank, no urinary/bowel symptoms.   Benign abdominal and pelvic exams.     Will check u/s to characterize cyst.   Discusses pain management with PCP, possibly Dr Cerda for pain most likely MSK in origin.   Doubt gyn etiology of pain.   Complicated pelvis with multiple surgeries, possibility of inclusion cyst discussed.   Will contact with results.   RTO 6 wks.       Diagnoses and all orders for this visit:    Other ovarian cyst, left side  -     US pelvis complete w transvaginal; Future    Other orders  -     Cholecalciferol 125 MCG (5000 UT) capsule; 1 capsule every 24 hours  -     cyclobenzaprine (FLEXERIL) 10 mg tablet; TAKE 1 TABLET EVERY 6-8 HOURS BY ORAL ROUTE AS NEEDED, FOR A MUSCLE RELAXANT.  -     lidocaine (LIDODERM) 5 %; daily          Subjective:      Patient ID: Elvie Mederos is a 54 y.o. female.    HPI here urgently in follow up to ER visit at [facility].   Reviewed [facility] notes from ER, CT from 24 and also from 2014. Prior [facility] notes reviewdd.    The following portions of the patient's history were reviewed and updated as appropriate: She  has a past medical history of Anxiety, Basal cell carcinoma (BCC) of skin of upper extremity including shoulder, BRCA1 negative, BRCA2 negative, Cancer (HCC) (), Depression, Gastric polyps, Hashimoto's disease, and Hyperthyroidism ().  She   Patient Active Problem List    Diagnosis Date Noted    Other ovarian cyst, left side 2024    Vitamin D deficiency 2024    Disease of thyroid gland 2023    GERD (gastroesophageal reflux disease) 2023    H/O right nephrectomy 2023    Heartburn 2023    Perianal irritation 2023    Pelvic peritoneal adhesions, female 2022    Symptom  associated with female genital organs 2022    H/O  section 2022    Donor of kidney for transplant 09/15/2021    Hypothyroidism due to acquired atrophy of thyroid 07/10/2020     She  has a past surgical history that includes Hysterectomy; AUGMENTATION BREAST;  section; Excision basal cell carcinoma (Bilateral); Carpal tunnel release; Debridement tennis elbow; Nephrectomy living donor (Right, 2022); Oophorectomy (Left, ); and Augmentation mammaplasty.  Her family history includes Asthma in her mother; Breast cancer (age of onset: 49) in her sister; Cancer in her father and mother; Coronary artery disease in her father; Dementia in her mother; Diabetes in her paternal grandfather; Heart disease in her maternal grandfather, maternal grandmother, mother, paternal grandfather, and paternal grandmother; Hyperlipidemia in her father and mother; Hypertension in her father; No Known Problems in her daughter and sister; Prostate cancer (age of onset: 65) in her father; Thyroid disease in her mother.  She  reports that she quit smoking about 8 years ago. Her smoking use included cigarettes. She started smoking about 28 years ago. She has a 10 pack-year smoking history. She has never used smokeless tobacco. She reports that she does not currently use alcohol. She reports that she does not currently use drugs.  Current Outpatient Medications   Medication Sig Dispense Refill    buPROPion (WELLBUTRIN XL) 300 mg 24 hr tablet Take 300 mg by mouth daily      Cholecalciferol 125 MCG (5000 UT) capsule 1 capsule every 24 hours      citalopram (CeleXA) 20 mg tablet TAKE 1 TABLET BY MOUTH ONCE DAILY WITH 10MG TABLET      clonazePAM (KlonoPIN) 0.5 mg tablet TAKE 1 TABLET BY MOUTH EVERY DAY AS NEEDED FOR SLEEP      cyclobenzaprine (FLEXERIL) 10 mg tablet TAKE 1 TABLET EVERY 6-8 HOURS BY ORAL ROUTE AS NEEDED, FOR A MUSCLE RELAXANT.      lidocaine (LIDODERM) 5 % daily      Synthroid 50 MCG tablet TAKE 1  "TABLET BY MOUTH IN THE AM 5 DAYS A WEEK AND 2 TABS 2 DAYS A WEEK. **9 TABS TOTAL A WEEK**       No current facility-administered medications for this visit.     She is allergic to sulfamethoxazole, trimethoprim, amoxicillin, bactrim [sulfamethoxazole-trimethoprim], and penicillins..    Review of Systems  see above    Objective:    /64 (BP Location: Left arm, Patient Position: Sitting, Cuff Size: Standard)   Ht 5' 5\" (1.651 m)   Wt 78 kg (172 lb)   LMP  (LMP Unknown)   BMI 28.62 kg/m²      Physical Exam    General appearance: no distress, pleasant  Abdomen: soft, non tender, no palpable masses, no guarding or rebound  Back no CVAT, point joint /muscle tenderness  Pelvic exam: normal atrophic external genitalia, urethral meatus normal, vagina atrophic without lesions, cuff intact, no adnexal masses, non tender  Rectal exam: normal sphincter tone, no masses, RV confirms above      DATA:  11/24/24 CT [facility], Reproductive: Hysterectomy. 6 cm left adnexal cystic mass.  Mesentery: No ascites.     7/2014 CT 5 cm cyst, right ovary  "

## 2024-11-26 NOTE — PATIENT INSTRUCTIONS
Dr Ricky Cerda, pain specialist  [facility] [facility] [address], [place] [address]  [phone]

## 2024-11-26 NOTE — LETTER
2024     Raj Johnson DO  [address] [place] [address]  [address] [place] [id number]    Patient: Elvie Mederos   YOB: 1970   Date of Visit: 2024       Dear Dr. Johnson:    Thank you for referring Elvie Mederos to me for evaluation. Below are my notes for this consultation.    If you have questions, please do not hesitate to call me. I look forward to following your patient along with you.         Sincerely,        Kelley Melton MD        CC: No Recipients    Kelley Melton MD  2024  8:45 AM  Sign when Signing Visit  Assessment/Plan:    Other ovarian cyst, left side  55 yo  s/p hysterectomy, RSO and left salpingectomy here urgently in follow up to [facility] ER visit of 24 for left flank pain.   Incidental finding on CT of 6 cm left adnexal cyst.   Pain is left flank, no urinary/bowel symptoms.   Benign abdominal and pelvic exams.     Will check u/s to characterize cyst.   Discusses pain management with PCP, possibly Dr Cerda for pain most likely MSK in origin.   Doubt gyn etiology of pain.   Complicated pelvis with multiple surgeries, possibility of inclusion cyst discussed.   Will contact with results.   RTO 6 wks.       Diagnoses and all orders for this visit:    Other ovarian cyst, left side  -     US pelvis complete w transvaginal; Future    Other orders  -     Cholecalciferol 125 MCG (5000 UT) capsule; 1 capsule every 24 hours  -     cyclobenzaprine (FLEXERIL) 10 mg tablet; TAKE 1 TABLET EVERY 6-8 HOURS BY ORAL ROUTE AS NEEDED, FOR A MUSCLE RELAXANT.  -     lidocaine (LIDODERM) 5 %; daily          Subjective:      Patient ID: Elvie Mederos is a 54 y.o. female.    HPI here urgently in follow up to ER visit at [facility].   Reviewed [facility] notes from ER, CT from 24 and also from . Prior [facility] notes reviewdd.    The following portions of the patient's history were reviewed and updated as appropriate: She  has a past medical history of Anxiety, Basal cell carcinoma (BCC) of skin of  upper extremity including shoulder, BRCA1 negative, BRCA2 negative, Cancer (HCC) (2014), Depression, Gastric polyps, Hashimoto's disease, and Hyperthyroidism (2019).  She   Patient Active Problem List    Diagnosis Date Noted   • Other ovarian cyst, left side 2024   • Vitamin D deficiency 2024   • Disease of thyroid gland 2023   • GERD (gastroesophageal reflux disease) 2023   • H/O right nephrectomy 2023   • Heartburn 2023   • Perianal irritation 2023   • Pelvic peritoneal adhesions, female 2022   • Symptom associated with female genital organs 2022   • H/O  section 2022   • Donor of kidney for transplant 09/15/2021   • Hypothyroidism due to acquired atrophy of thyroid 07/10/2020     She  has a past surgical history that includes Hysterectomy; AUGMENTATION BREAST;  section; Excision basal cell carcinoma (Bilateral); Carpal tunnel release; Debridement tennis elbow; Nephrectomy living donor (Right, 2022); Oophorectomy (Left, ); and Augmentation mammaplasty.  Her family history includes Asthma in her mother; Breast cancer (age of onset: 49) in her sister; Cancer in her father and mother; Coronary artery disease in her father; Dementia in her mother; Diabetes in her paternal grandfather; Heart disease in her maternal grandfather, maternal grandmother, mother, paternal grandfather, and paternal grandmother; Hyperlipidemia in her father and mother; Hypertension in her father; No Known Problems in her daughter and sister; Prostate cancer (age of onset: 65) in her father; Thyroid disease in her mother.  She  reports that she quit smoking about 8 years ago. Her smoking use included cigarettes. She started smoking about 28 years ago. She has a 10 pack-year smoking history. She has never used smokeless tobacco. She reports that she does not currently use alcohol. She reports that she does not currently use drugs.  Current Outpatient  "Medications   Medication Sig Dispense Refill   • buPROPion (WELLBUTRIN XL) 300 mg 24 hr tablet Take 300 mg by mouth daily     • Cholecalciferol 125 MCG (5000 UT) capsule 1 capsule every 24 hours     • citalopram (CeleXA) 20 mg tablet TAKE 1 TABLET BY MOUTH ONCE DAILY WITH 10MG TABLET     • clonazePAM (KlonoPIN) 0.5 mg tablet TAKE 1 TABLET BY MOUTH EVERY DAY AS NEEDED FOR SLEEP     • cyclobenzaprine (FLEXERIL) 10 mg tablet TAKE 1 TABLET EVERY 6-8 HOURS BY ORAL ROUTE AS NEEDED, FOR A MUSCLE RELAXANT.     • lidocaine (LIDODERM) 5 % daily     • Synthroid 50 MCG tablet TAKE 1 TABLET BY MOUTH IN THE AM 5 DAYS A WEEK AND 2 TABS 2 DAYS A WEEK. **9 TABS TOTAL A WEEK**       No current facility-administered medications for this visit.     She is allergic to sulfamethoxazole, trimethoprim, amoxicillin, bactrim [sulfamethoxazole-trimethoprim], and penicillins..    Review of Systems  see above    Objective:    /64 (BP Location: Left arm, Patient Position: Sitting, Cuff Size: Standard)   Ht 5' 5\" (1.651 m)   Wt 78 kg (172 lb)   LMP  (LMP Unknown)   BMI 28.62 kg/m²      Physical Exam    General appearance: no distress, pleasant  Abdomen: soft, non tender, no palpable masses, no guarding or rebound  Back no CVAT, point joint /muscle tenderness  Pelvic exam: normal atrophic external genitalia, urethral meatus normal, vagina atrophic without lesions, cuff intact, no adnexal masses, non tender  Rectal exam: normal sphincter tone, no masses, RV confirms above      DATA:  11/24/24 CT [facility], Reproductive: Hysterectomy. 6 cm left adnexal cystic mass.  Mesentery: No ascites.     7/2014 CT 5 cm cyst, right ovary  "

## 2024-11-29 ENCOUNTER — HOSPITAL ENCOUNTER (OUTPATIENT)
Dept: HOSPITAL 99 - HWRAD | Age: 54
End: 2024-11-29
Payer: COMMERCIAL

## 2024-11-29 DIAGNOSIS — N83.292: Primary | ICD-10-CM

## 2024-12-02 ENCOUNTER — RESULTS FOLLOW-UP (OUTPATIENT)
Dept: OBGYN CLINIC | Facility: CLINIC | Age: 54
End: 2024-12-02

## 2024-12-02 DIAGNOSIS — N83.292 OTHER OVARIAN CYST, LEFT SIDE: Primary | ICD-10-CM

## 2024-12-13 ENCOUNTER — TELEPHONE (OUTPATIENT)
Dept: NEPHROLOGY | Facility: CLINIC | Age: 54
End: 2024-12-13

## 2024-12-13 NOTE — TELEPHONE ENCOUNTER
Called pt to schedule an April follow up with Dr. Yost or AP. Spoke to pt and she is now scheduled to see Karissa on 4/21/25 in the QO.

## 2024-12-22 ENCOUNTER — HOSPITAL ENCOUNTER (OUTPATIENT)
Dept: ULTRASOUND IMAGING | Facility: HOSPITAL | Age: 54
Discharge: HOME | End: 2024-12-22
Attending: OBSTETRICS & GYNECOLOGY
Payer: COMMERCIAL

## 2024-12-22 DIAGNOSIS — N83.292 OTHER OVARIAN CYST, LEFT SIDE: ICD-10-CM

## 2024-12-22 PROCEDURE — 76830 TRANSVAGINAL US NON-OB: CPT

## 2024-12-22 PROCEDURE — 76856 US EXAM PELVIC COMPLETE: CPT

## 2024-12-31 ENCOUNTER — TELEPHONE (OUTPATIENT)
Age: 54
End: 2024-12-31

## 2024-12-31 NOTE — TELEPHONE ENCOUNTER
Madison calling in for significant findings for pelvic US from 12/22/24.     Dr plata is out of office- her message says to contact on call provider     Epic Secure Chat sent to Dr Morales - on call   Epic Secure Chat received: Ok. Scrubbed in OR. Will check later.

## 2025-01-03 ENCOUNTER — RESULTS FOLLOW-UP (OUTPATIENT)
Dept: OBGYN CLINIC | Facility: CLINIC | Age: 55
End: 2025-01-03

## 2025-01-04 NOTE — ASSESSMENT & PLAN NOTE
Initially seen on 24 on CT ordered in ER for pain, 6 cm on left.   Pt with continued LLQ and left back pain with bladder spasm and incontinence.   Complicated pelvis with two  sections, hysterectomy LS, RSO and kidney donor. Has been told her pelvis has significant adhesions from prior gyn who performed RSO and then hysterectomy.   Pt has been unable to obtain old records from .    Reviewed report and images from 24 u/s: s/p hysterectomy and RSO. Left ovary 9 x 6 x 4.1 cm with 6.4 cm bilocular and 2.7 cm unilocular cysts. Normal Dopplers. No suspicious masses, no free fluid.  O-RADS 2, with f/u recommended in 6 months    Findings could be due to peritoneal inclusion cysts. Pt would like surgical exploration and removal of cysts if possible as she believes LLQ and back pain are due to cysts. Does not want to wait and f/u imaging.  Aware of potential for musculoskeletal etiology for pain which would not be relieved with surgery.     Referral given for gyn onc surgical evaluation secondary to complicated pelvis.    Orders:    Ambulatory Referral to Gynecologic Oncology; Future

## 2025-01-04 NOTE — PROGRESS NOTES
Name: Elvie Mederos      : 1970      MRN: [id number]  Encounter Provider: Kelley Melton MD  Encounter Date: 2025   Encounter department: [facility] [place]  :  Assessment & Plan  Other ovarian cyst, left side  Initially seen on 24 on CT ordered in ER for pain, 6 cm on left.   Pt with continued LLQ and left back pain with bladder spasm and incontinence.   Complicated pelvis with two  sections, hysterectomy LS, RSO and kidney donor. Has been told her pelvis has significant adhesions from prior gyn who performed RSO and then hysterectomy.   Pt has been unable to obtain old records from .    Reviewed report and images from 24 u/s: s/p hysterectomy and RSO. Left ovary 9 x 6 x 4.1 cm with 6.4 cm bilocular and 2.7 cm unilocular cysts. Normal Dopplers. No suspicious masses, no free fluid.  O-RADS 2, with f/u recommended in 6 months    Findings could be due to peritoneal inclusion cysts. Pt would like surgical exploration and removal of cysts if possible as she believes LLQ and back pain are due to cysts. Does not want to wait and f/u imaging.  Aware of potential for musculoskeletal etiology for pain which would not be relieved with surgery.     Referral given for gyn onc surgical evaluation secondary to complicated pelvis.    Orders:    Ambulatory Referral to Gynecologic Oncology; Future    Complex cyst of left ovary    Orders:    Ambulatory Referral to Gynecologic Oncology; Future    Colon cancer screening  Has h/o colon polyps, 3 colonsocopies in past, last >5 years ago. Referral given.  Orders:    Ambulatory Referral to Gastroenterology; Future    OAB (overactive bladder)  Pt reports incontinence, questions association with pelvic cyst. Has pain and then urge incontinence.   No h/o glaucoma.  R&B of medication reviewed, interested.   Rx Detrol LA sent.  Orders:    tolterodine (DETROL LA) 2 mg 24 hr capsule; Take 1 capsule (2 mg total) by mouth daily    History of  Present Illness   Elvie Mederos is a 54 y.o. female who presents to f/u after pelvic u/s findings.    Review of Systems +intermittent LLQ and left flank pain. +OAB with incontinence  Current Outpatient Medications on File Prior to Visit   Medication Sig Dispense Refill    buPROPion (WELLBUTRIN XL) 300 mg 24 hr tablet Take 300 mg by mouth daily      citalopram (CeleXA) 20 mg tablet TAKE 1 TABLET BY MOUTH ONCE DAILY WITH 10MG TABLET      clonazePAM (KlonoPIN) 0.5 mg tablet TAKE 1 TABLET BY MOUTH EVERY DAY AS NEEDED FOR SLEEP      cyclobenzaprine (FLEXERIL) 10 mg tablet TAKE 1 TABLET EVERY 6-8 HOURS BY ORAL ROUTE AS NEEDED, FOR A MUSCLE RELAXANT.      Synthroid 50 MCG tablet TAKE 1 TABLET BY MOUTH IN THE AM 5 DAYS A WEEK AND 2 TABS 2 DAYS A WEEK. **9 TABS TOTAL A WEEK**      Cholecalciferol 125 MCG (5000 UT) capsule 1 capsule every 24 hours       No current facility-administered medications on file prior to visit.         Objective /74; wt 176 lb, BMI 29.35     Physical Exam  Appears well, no apparent distress.   Does not appear anxious or depressed.  11/26/24 exam: Abdomen: soft, non tender, no palpable masses, no guarding or rebound  Back no CVAT, point joint /muscle tenderness  Pelvic exam: normal atrophic external genitalia, urethral meatus normal, vagina atrophic without lesions, cuff intact, no adnexal masses, non tender  Rectal exam: normal sphincter tone, no masses, RV confirms above

## 2025-01-06 ENCOUNTER — OFFICE VISIT (OUTPATIENT)
Dept: OBGYN CLINIC | Facility: CLINIC | Age: 55
End: 2025-01-06
Payer: COMMERCIAL

## 2025-01-06 ENCOUNTER — DOCUMENTATION (OUTPATIENT)
Dept: HEMATOLOGY ONCOLOGY | Facility: CLINIC | Age: 55
End: 2025-01-06

## 2025-01-06 VITALS
DIASTOLIC BLOOD PRESSURE: 74 MMHG | WEIGHT: 176.4 LBS | SYSTOLIC BLOOD PRESSURE: 108 MMHG | HEIGHT: 65 IN | BODY MASS INDEX: 29.39 KG/M2

## 2025-01-06 DIAGNOSIS — N83.292 COMPLEX CYST OF LEFT OVARY: ICD-10-CM

## 2025-01-06 DIAGNOSIS — N32.81 OAB (OVERACTIVE BLADDER): ICD-10-CM

## 2025-01-06 DIAGNOSIS — Z12.11 COLON CANCER SCREENING: ICD-10-CM

## 2025-01-06 DIAGNOSIS — N83.292 OTHER OVARIAN CYST, LEFT SIDE: Primary | ICD-10-CM

## 2025-01-06 PROCEDURE — 99213 OFFICE O/P EST LOW 20 MIN: CPT | Performed by: OBSTETRICS & GYNECOLOGY

## 2025-01-06 RX ORDER — TOLTERODINE 2 MG/1
2 CAPSULE, EXTENDED RELEASE ORAL DAILY
Qty: 30 CAPSULE | Refills: 3 | Status: SHIPPED | OUTPATIENT
Start: 2025-01-06 | End: 2025-01-14

## 2025-01-06 NOTE — LETTER
2025     Raj Johnson DO  [address] [place] [address]  [address] [place] [id number]    Patient: Elvie Mederos   YOB: 1970   Date of Visit: 2025       Dear Dr. Johnson:    Elvie Mederos was in to see me today for evaluation. Below are my notes for this visit.    If you have questions, please do not hesitate to call me. I look forward to following your patient along with you.         Sincerely,        Kelley Melton MD        CC: No Recipients    Kelley Melton MD  2025  9:30 AM  Sign when Signing Visit  Name: Elvie Mederos      : 1970      MRN: [id number]  Encounter Provider: Kelley Melton MD  Encounter Date: 2025   Encounter department: [facility] [place]  :  Assessment & Plan  Other ovarian cyst, left side  Initially seen on 24 on CT ordered in ER for pain, 6 cm on left.   Pt with continued LLQ and left back pain with bladder spasm and incontinence.   Complicated pelvis with two  sections, hysterectomy LS, RSO and kidney donor. Has been told her pelvis has significant adhesions from prior gyn who performed RSO and then hysterectomy.   Pt has been unable to obtain old records from .    Reviewed report and images from 24 u/s: s/p hysterectomy and RSO. Left ovary 9 x 6 x 4.1 cm with 6.4 cm bilocular and 2.7 cm unilocular cysts. Normal Dopplers. No suspicious masses, no free fluid.  O-RADS 2, with f/u recommended in 6 months    Findings could be due to peritoneal inclusion cysts. Pt would like surgical exploration and removal of cysts if possible as she believes LLQ and back pain are due to cysts. Does not want to wait and f/u imaging.  Aware of potential for musculoskeletal etiology for pain which would not be relieved with surgery.     Referral given for gyn onc surgical evaluation secondary to complicated pelvis.    Orders:  •  Ambulatory Referral to Gynecologic Oncology; Future    Complex cyst of left ovary    Orders:  •  Ambulatory  Referral to Gynecologic Oncology; Future    Colon cancer screening  Has h/o colon polyps, 3 colonsocopies in past, last >5 years ago. Referral given.  Orders:  •  Ambulatory Referral to Gastroenterology; Future    OAB (overactive bladder)  Pt reports incontinence, questions association with pelvic cyst. Has pain and then urge incontinence.   No h/o glaucoma.  R&B of medication reviewed, interested.   Rx Detrol LA sent.  Orders:  •  tolterodine (DETROL LA) 2 mg 24 hr capsule; Take 1 capsule (2 mg total) by mouth daily    History of Present Illness  Elvie Mederos is a 54 y.o. female who presents to f/u after pelvic u/s findings.    Review of Systems +intermittent LLQ and left flank pain. +OAB with incontinence  Current Outpatient Medications on File Prior to Visit   Medication Sig Dispense Refill   • buPROPion (WELLBUTRIN XL) 300 mg 24 hr tablet Take 300 mg by mouth daily     • citalopram (CeleXA) 20 mg tablet TAKE 1 TABLET BY MOUTH ONCE DAILY WITH 10MG TABLET     • clonazePAM (KlonoPIN) 0.5 mg tablet TAKE 1 TABLET BY MOUTH EVERY DAY AS NEEDED FOR SLEEP     • cyclobenzaprine (FLEXERIL) 10 mg tablet TAKE 1 TABLET EVERY 6-8 HOURS BY ORAL ROUTE AS NEEDED, FOR A MUSCLE RELAXANT.     • Synthroid 50 MCG tablet TAKE 1 TABLET BY MOUTH IN THE AM 5 DAYS A WEEK AND 2 TABS 2 DAYS A WEEK. **9 TABS TOTAL A WEEK**     • Cholecalciferol 125 MCG (5000 UT) capsule 1 capsule every 24 hours       No current facility-administered medications on file prior to visit.         ObjectiveBP 108/74; wt 176 lb, BMI 29.35     Physical Exam  Appears well, no apparent distress.   Does not appear anxious or depressed.  11/26/24 exam: Abdomen: soft, non tender, no palpable masses, no guarding or rebound  Back no CVAT, point joint /muscle tenderness  Pelvic exam: normal atrophic external genitalia, urethral meatus normal, vagina atrophic without lesions, cuff intact, no adnexal masses, non tender  Rectal exam: normal sphincter tone, no masses, RV  confirms above

## 2025-01-06 NOTE — PROGRESS NOTES
"Chart rvw'd on 2025      Referred by:  Dr. Kelley Melton    Diagnosis:  Ovarian cyst  Complex cyst of the L ovary    Imagin2024 US pelvis complete w transvaginal-  1.  Status post hysterectomy and right oophorectomy.     2.  Two cysts in the left ovary, the larger a 6.4 x 3.5 x 5.4 cm bilocular cyst. Based on the ACR O-RADS US v2022 system, this is O-RADS category 2 (almost certain benign with <1% risk of malignancy.) The management recommendation is follow-up pelvic   sonogram in 6 months.      Pathology:  N/A      Scheduled appointments:  N/A      Surgery:  Hysterectomy LS, RSO      Post surgical pathology:  N/A    Per MD note:  \"Findings could be due to peritoneal inclusion cysts. Pt would like surgical exploration and removal of cysts if possible as she believes LLQ and back pain are due to cysts. Does not want to wait and f/u imaging.  Aware of potential for musculoskeletal etiology for pain which would not be relieved with surgery.      Referral given for gyn onc surgical evaluation secondary to complicated pelvis.\"      "

## 2025-01-14 ENCOUNTER — TELEPHONE (OUTPATIENT)
Age: 55
End: 2025-01-14

## 2025-01-14 ENCOUNTER — CONSULT (OUTPATIENT)
Age: 55
End: 2025-01-14
Payer: COMMERCIAL

## 2025-01-14 VITALS
DIASTOLIC BLOOD PRESSURE: 78 MMHG | BODY MASS INDEX: 30.12 KG/M2 | WEIGHT: 180.8 LBS | HEIGHT: 65 IN | SYSTOLIC BLOOD PRESSURE: 110 MMHG

## 2025-01-14 DIAGNOSIS — Z12.11 COLON CANCER SCREENING: Primary | ICD-10-CM

## 2025-01-14 DIAGNOSIS — K21.9 GASTROESOPHAGEAL REFLUX DISEASE, UNSPECIFIED WHETHER ESOPHAGITIS PRESENT: ICD-10-CM

## 2025-01-14 DIAGNOSIS — Z86.0100 HISTORY OF COLON POLYPS: ICD-10-CM

## 2025-01-14 PROBLEM — Z85.828 HISTORY OF BASAL CELL CARCINOMA: Status: ACTIVE | Noted: 2025-01-14

## 2025-01-14 PROCEDURE — 99204 OFFICE O/P NEW MOD 45 MIN: CPT | Performed by: PHYSICIAN ASSISTANT

## 2025-01-14 RX ORDER — SODIUM CHLORIDE, SODIUM LACTATE, POTASSIUM CHLORIDE, CALCIUM CHLORIDE 600; 310; 30; 20 MG/100ML; MG/100ML; MG/100ML; MG/100ML
125 INJECTION, SOLUTION INTRAVENOUS CONTINUOUS
OUTPATIENT
Start: 2025-01-14

## 2025-01-14 NOTE — ASSESSMENT & PLAN NOTE
Chronic, well controlled with diet at this time  Recommend diet/lifestyle modifications - handout(s) provided today

## 2025-01-14 NOTE — PROGRESS NOTES
Name: Elvie Mederos      : 1970      MRN: [id number]  Encounter Provider: Sary Kaur PA-C  Encounter Date: 2025   Encounter department: [facility]OENTER[facility] [place]    :  Assessment & Plan  Colon cancer screening  Last colonoscopy: 12/3/2020  recall: 5 yr(s)  due:   Patient agreeable to schedule colonoscopy. No contraindications to perform procedure at [facility].  Miralax/magnesium citrate prep  Will need to obtain prior colonoscopy record from [place]  Orders:    Ambulatory Referral to Gastroenterology    Colonoscopy; Future    History of colon polyps  5 yr colonoscopy recall  Orders:    Colonoscopy; Future    Gastroesophageal reflux disease, unspecified whether esophagitis present  Chronic, well controlled with diet at this time  Recommend diet/lifestyle modifications - handout(s) provided today         Return for colonoscopy.      History of Present Illness     Chief Complaint   Patient presents with    Colonoscopy Consult     Pt said she has a large cyst on her ovary, has consult scheduled with oncologist. Dr. Melton referred for GI consult. Last colonoscopy 12/3/2020. Not experiencing any GI symptoms.       Accompanied by self and history is obtained from self.    Elvie Mederos is a 54 y.o. year old female with a PMH significant for GERD, colon polyps, hypothyroidism, vitamin D3 deficiency, hx/o R nephrectomy (transplant donor, ), hx/o BCC, anxiety who presents for evaluation of colon cancer screening.    Colon cancer screening  Last in , 5 yr recall for hx/o polyps  Referred by GYN/Dr. Melton for colonoscopy  Has large left ovarian cyst getting larger ? has gyn onc consult scheduled , possible surgery  Health insurance will lapse in   -----  Is having some intermittent LLQ/flank pain, attributed to ovarian cyst    Acid reflux  Intermittent, currently managed with diet  Chocolate is huge trigger  Takes TUMS as needed    Review of Systems  "  Constitutional:  Negative for appetite change, chills, fever and unexpected weight change.   HENT:  Negative for sore throat, trouble swallowing and voice change.    Respiratory:  Negative for cough and choking.    Cardiovascular:  Negative for chest pain and leg swelling.   Gastrointestinal:  Positive for abdominal pain (LLQ). Negative for abdominal distention, anal bleeding, blood in stool, constipation, diarrhea, nausea, rectal pain and vomiting.   Skin:  Negative for pallor and rash.   Psychiatric/Behavioral:  Negative for confusion and sleep disturbance. The patient is nervous/anxious.        GI History:  Previous issues: None  Blood thinners: ASA: no antiplatelet: no anticoagulation: no  NSAID use: none  Caffeine use: 16 oz tea + 2 sodas/day  Tobacco/nicotine use: former 0.5 PPD; quit   EtOH use: rare, special occasions  Cannabis use: occasional    Abdominal Surgical Hx: s/p hysterectomy and RSO,  x2, R nephrectomy  Family Hx: Denies first degree relatives with GI malignancies.  GI procedure Hx:  EGD:   Colonoscopy: 12/3/2020, 5 yr recall: polyps  GI imaging Hx: None    U/S pelvis w/ transvaginal: 2024: \"IMPRESSION:     1.  Status post hysterectomy and right oophorectomy.     2.  Two cysts in the left ovary, the larger a 6.4 x 3.5 x 5.4 cm bilocular cyst. Based on the ACR O-RADS US v2022 system, this is O-RADS category 2 (almost certain benign with <1% risk of malignancy.) The management recommendation is follow-up pelvic   sonogram in 6 months.\"    Objective   /78   Ht 5' 5\" (1.651 m)   Wt 82 kg (180 lb 12.8 oz)   LMP  (LMP Unknown)   BMI 30.09 kg/m²     Blood pressure 110/78, height 5' 5\" (1.651 m), weight 82 kg (180 lb 12.8 oz), not currently breastfeeding. Body mass index is 30.09 kg/m².    Physical Exam  Vitals and nursing note reviewed.   Constitutional:       General: She is not in acute distress.     Appearance: She is not toxic-appearing.   HENT:      Head: " Normocephalic.      Mouth/Throat:      Mouth: Mucous membranes are moist.      Pharynx: Oropharynx is clear.   Eyes:      General: No scleral icterus.     Extraocular Movements: Extraocular movements intact.   Neck:      Trachea: Phonation normal.   Cardiovascular:      Rate and Rhythm: Normal rate and regular rhythm.      Heart sounds: No murmur heard.     No friction rub. No gallop.   Pulmonary:      Effort: Pulmonary effort is normal. No respiratory distress.      Breath sounds: No wheezing, rhonchi or rales.   Abdominal:      General: Bowel sounds are normal. There is no distension or abdominal bruit.      Palpations: Abdomen is soft. There is no hepatomegaly or splenomegaly.      Tenderness: There is no abdominal tenderness. There is no guarding or rebound.   Musculoskeletal:      Cervical back: Neck supple.      Comments: Moving all 4 extremities spontaneously   Skin:     General: Skin is warm and dry.      Capillary Refill: Capillary refill takes less than 2 seconds.      Coloration: Skin is not jaundiced or pale.   Neurological:      General: No focal deficit present.      Mental Status: She is alert and oriented to person, place, and time.   Psychiatric:         Behavior: Behavior normal. Behavior is cooperative.         Thought Content: Thought content normal.       I have spent a total time of 30 minutes on 01/14/25 in caring for this patient including Diagnostic results, Prognosis, Risks and benefits of tx options, Instructions for management, Patient and family education, Importance of tx compliance, Risk factor reductions, Impressions, Counseling / Coordination of care, Documenting in the medical record, Reviewing / ordering tests, medicine, procedures  , and Obtaining or reviewing history  .    Patient expressed understanding and had all questions and concerns addressed.    Sary Kaur PA-C  01/14/25  3:15 PM    This chart was completed in part utilizing [employer]  recognition software. Random word insertions, pronoun errors, and incomplete sentences are an occasional consequence of this system due to software limitations, and ambient noise. Any questions or concerns about the content, text, or information contained within the body of this dictation should be directly addressed to the provider for clarification.

## 2025-01-14 NOTE — TELEPHONE ENCOUNTER
Scheduled date of colonoscopy (as of today): 4/14/2025  Physician performing colonoscopy: ND  Location of colonoscopy: [facility]  Bowel prep reviewed with patient: Davian/Mag Citrate  Instructions reviewed with patient by: JENNA  Clearances: N

## 2025-01-14 NOTE — PATIENT INSTRUCTIONS
Miralax + Gatorade + magnesium citrate colonoscopy prep:            Strategies to help with acid reflux:  Eat smaller more frequent meals  Wait 3 hours between eating and laying down  Raise the head of the bed six inches or 30 degrees  Foods to avoid if you are having more symptoms: spicy foods, fatty foods, hot dogs, ribs, sausages, cream, citrus fruit juices, garlic/onion, coffee/tea, tomato-based foods, chocolate, spearmint, peppermint, carbonated beverages (fizzy drinks), alcohol    Good foods for reflux:  High-fiber foods  Fibrous foods make you feel full so you're less likely to overeat, which may contribute to heartburn. So, load up on healthy fiber from these foods:    -Whole grains such as oatmeal, couscous and brown rice.  -Root vegetables such as sweet potatoes, carrots and beets.  -Green vegetables such as asparagus, broccoli and green beans.    Alkaline foods  Foods fall somewhere along the pH scale (an indicator of acid levels). Those that have a low pH are acidic and more likely to cause reflux. Those with higher pH are alkaline and can help offset strong stomach acid. Alkaline foods include:    -Bananas  -Melons  -Cauliflower  -Fennel  -Nuts    Watery foods  Eating foods that contain a lot of water can dilute and weaken stomach acid. Choose foods such as:    -Celery  -Cucumber  -Lettuce  -Watermelon  -Broth-based soups  -Herbal tea

## 2025-01-17 ENCOUNTER — CONSULT (OUTPATIENT)
Dept: GYNECOLOGIC ONCOLOGY | Facility: CLINIC | Age: 55
End: 2025-01-17
Payer: COMMERCIAL

## 2025-01-17 VITALS
DIASTOLIC BLOOD PRESSURE: 78 MMHG | WEIGHT: 178 LBS | BODY MASS INDEX: 29.66 KG/M2 | RESPIRATION RATE: 16 BRPM | HEART RATE: 80 BPM | TEMPERATURE: 97.7 F | OXYGEN SATURATION: 98 % | HEIGHT: 65 IN | SYSTOLIC BLOOD PRESSURE: 106 MMHG

## 2025-01-17 DIAGNOSIS — N83.202 LEFT OVARIAN CYST: Primary | ICD-10-CM

## 2025-01-17 DIAGNOSIS — N73.6 PELVIC PERITONEAL ADHESIONS, FEMALE: ICD-10-CM

## 2025-01-17 DIAGNOSIS — N83.292 OTHER OVARIAN CYST, LEFT SIDE: ICD-10-CM

## 2025-01-17 DIAGNOSIS — N83.292 COMPLEX CYST OF LEFT OVARY: ICD-10-CM

## 2025-01-17 DIAGNOSIS — N18.32 STAGE 3B CHRONIC KIDNEY DISEASE (HCC): ICD-10-CM

## 2025-01-17 PROBLEM — N18.9 CKD (CHRONIC KIDNEY DISEASE): Status: ACTIVE | Noted: 2025-01-17

## 2025-01-17 PROBLEM — N18.9 CKD (CHRONIC KIDNEY DISEASE): Status: RESOLVED | Noted: 2025-01-17 | Resolved: 2025-01-17

## 2025-01-17 PROCEDURE — 99459 PELVIC EXAMINATION: CPT | Performed by: OBSTETRICS & GYNECOLOGY

## 2025-01-17 PROCEDURE — 99205 OFFICE O/P NEW HI 60 MIN: CPT | Performed by: OBSTETRICS & GYNECOLOGY

## 2025-01-17 RX ORDER — CLINDAMYCIN PHOSPHATE 900 MG/50ML
900 INJECTION, SOLUTION INTRAVENOUS ONCE
OUTPATIENT
Start: 2025-01-17 | End: 2025-01-17

## 2025-01-17 RX ORDER — METRONIDAZOLE 500 MG/1
500 TABLET ORAL ONCE
Qty: 1 TABLET | Refills: 0 | Status: SHIPPED | OUTPATIENT
Start: 2025-01-17 | End: 2025-01-17

## 2025-01-17 RX ORDER — ACETAMINOPHEN 325 MG/1
975 TABLET ORAL ONCE
OUTPATIENT
Start: 2025-01-17 | End: 2025-01-17

## 2025-01-17 RX ORDER — POLYETHYLENE GLYCOL 3350 17 G/17G
POWDER, FOR SOLUTION ORAL
Qty: 238 G | Refills: 0 | Status: SHIPPED | OUTPATIENT
Start: 2025-01-17

## 2025-01-17 RX ORDER — NEOMYCIN SULFATE 500 MG/1
1000 TABLET ORAL 3 TIMES DAILY
Qty: 6 TABLET | Refills: 0 | Status: SHIPPED | OUTPATIENT
Start: 2025-01-17 | End: 2025-01-18

## 2025-01-17 RX ORDER — HEPARIN SODIUM 5000 [USP'U]/ML
5000 INJECTION, SOLUTION INTRAVENOUS; SUBCUTANEOUS
OUTPATIENT
Start: 2025-01-17 | End: 2025-01-18

## 2025-01-17 RX ORDER — SODIUM CHLORIDE, SODIUM LACTATE, POTASSIUM CHLORIDE, CALCIUM CHLORIDE 600; 310; 30; 20 MG/100ML; MG/100ML; MG/100ML; MG/100ML
125 INJECTION, SOLUTION INTRAVENOUS CONTINUOUS
OUTPATIENT
Start: 2025-01-17

## 2025-01-17 NOTE — PATIENT INSTRUCTIONS
1. Nothing to eat or drink after midnight prior to the operation.    2. Please avoid ibuprofen, aspirin, fish oil for 7 days prior to surgery  3. Medications to take the morning of surgery with a sip of water: synthroid

## 2025-01-17 NOTE — ASSESSMENT & PLAN NOTE
Lab Results   Component Value Date    EGFR 38 (L) 10/14/2024    EGFR 42 (L) 10/14/2024    EGFR 39 (L) 03/07/2022    CREATININE 1.61 (H) 10/14/2024    CREATININE 1.58 (H) 03/07/2022    CREATININE 1.67 (H) 02/19/2022     Continue to monitor, follows with nephrology

## 2025-01-17 NOTE — ASSESSMENT & PLAN NOTE
From multiple prior pelvic surgeries.  She understands the increased risk of potential bowel or bladder injury as above.  Orders:    Case request operating room: OOPHORECTOMY, LAPAROSCOPIC W/ROBOTICS; Standing

## 2025-01-17 NOTE — PROGRESS NOTES
Name: Elvie Mederos      : 1970      MRN: [id number]  Encounter Provider: Galileo Gonsalves MD  Encounter Date: 2025   Encounter department: [facility] [place]  :  Assessment & Plan  Pelvic peritoneal adhesions, female  From multiple prior pelvic surgeries.  She understands the increased risk of potential bowel or bladder injury as above.  Orders:    Case request operating room: OOPHORECTOMY, LAPAROSCOPIC W/ROBOTICS; Standing    Stage 3b chronic kidney disease (HCC)  Lab Results   Component Value Date    EGFR 38 (L) 10/14/2024    EGFR 42 (L) 10/14/2024    EGFR 39 (L) 2022    CREATININE 1.61 (H) 10/14/2024    CREATININE 1.58 (H) 2022    CREATININE 1.67 (H) 2022     Continue to monitor, follows with nephrology       Left ovarian cyst  54-year-old with multiple prior pelvic and abdominal surgeries including 2 previous  sections, laparoscopic right donor nephrectomy, right ovarian cystectomy, right oophorectomy and hysterectomy with extensive lysis of adhesions with a 9 x 6 x 4.1 cm multicystic left ovarian mass which was classified as O rad category 2.  Her performance status is 0.  1.  I reviewed the differential diagnosis of the pelvic cyst including ovarian cyst, peritoneal inclusion cyst.  She understands that imaging is consistent with benign disease, however, borderline ovarian tumors and malignant tumors are also in the differential.  We discussed surgical intervention versus observation.  She would prefer surgical intervention.  2.  I discussed the risks of examination under anesthesia, robotic assisted total laparoscopic left oophorectomy, possible exploratory laparotomy.  She understands the risks of the surgery including the potential additional risks of injury to the bowel or bladder, injury to the left ureter with possible resultant kidney damage and she agrees to proceed as outlined.  Consent for surgery was obtained by me in the  office.  3.  Given risk of bowel injury from prior surgery, I prescribed a preoperative bowel preparation  4.  We discussed perioperative activity limitations, medication management.  Thank you for courtesy of this consultation.  All questions were answered by the end of the visit.  Orders:    Case request operating room: OOPHORECTOMY, LAPAROSCOPIC W/ROBOTICS; Standing    Type and screen; Future    CBC and Platelet; Future    Basic metabolic panel; Future    Protime-INR; Future    EKG 12 lead; Future    XR chest pa and lateral; Future    neomycin (MYCIFRADIN) 500 mg tablet; Take 2 tablets (1,000 mg total) by mouth 3 (three) times a day for 3 doses Take at 1 PM, 4 PM, and 9 PM the day before procedure. (Patient not taking: Reported on 2025)    metroNIDAZOLE (FLAGYL) 500 mg tablet; Take 1 tablet (500 mg total) by mouth once for 1 dose Take at 9 PM the night before the procedure (Patient not taking: Reported on 2025)    polyethylene glycol (MiraLax) 17 GM/SCOOP powder; Mix with 64 oz Gatorade, begin 4 PM day before surgery per bowel prep instructions.            History of Present Illness   Reason for Visit / CC: 9.6 cm left ovarian mass   Elvie Mederos is a 54 y.o. female   Who presents as a consultation from Dr. Melton with a 9.6 x 4.1 cm complex appearing left ovarian cyst by ultrasound on 2024.  I personally reviewed the images and reviewed them with the patient.  The cyst is mainly bilobed.  There is some debris within the cyst.  Minimal complexity along the cyst wall.  I agree with the radiologist interpretation.  She is status post multiple prior surgeries including 2 previous  sections, right donor nephrectomy, right ovarian cystectomy, right oophorectomy with hysterectomy, extensive lysis of adhesions.  She has chronic kidney disease with a current serum creatinine of 1.61 mg/dL.  She follows with nephrology.  She does have occasional pelvic pain.  She is concerned about the cyst.  She is  able to perform her actives of daily living.      Pertinent Medical History         Review of Systems   Constitutional:  Negative for activity change and unexpected weight change.   HENT: Negative.     Eyes: Negative.    Respiratory: Negative.     Cardiovascular: Negative.    Gastrointestinal:  Negative for abdominal distention and abdominal pain.   Endocrine: Negative.    Genitourinary:  Positive for pelvic pain. Negative for vaginal bleeding.   Musculoskeletal: Negative.    Skin: Negative.    Allergic/Immunologic: Negative.    Neurological: Negative.    Hematological: Negative.    Psychiatric/Behavioral: Negative.      A complete review of systems is negative other than that noted above in the HPI.  Past Medical History   Past Medical History:   Diagnosis Date    Anxiety     Basal cell carcinoma (BCC) of skin of upper extremity including shoulder     BRCA1 negative     BRCA2 negative     Cancer (HCC) 2014    Basal cell    Colon polyp 2010    Depression 2012    Disease of thyroid gland 2020    Hypo    Gastric polyps     Hashimoto's disease     Hyperthyroidism 2019    Lactose intolerance      Past Surgical History:   Procedure Laterality Date    ABDOMINAL SURGERY          AUGMENTATION BREAST      AUGMENTATION MAMMAPLASTY      BASAL CELL CARCINOMA EXCISION Bilateral     shoulders    CARPAL TUNNEL RELEASE       SECTION      x2; ,     COLONOSCOPY  2020    h/o polyps, [facility] in [facility]    COLONOSCOPY  2015    [place]    COLONOSCOPY  2010ish    DEBRIDEMENT TENNIS ELBOW      HYSTERECTOMY  2015    Left Salpingectomy    NEPHRECTOMY LIVING DONOR Right 2022    OOPHORECTOMY Right     cystic; Dr Obrien;  with lysis of adhesion     Family History   Problem Relation Age of Onset    Asthma Mother         Svitlana Kwan    Cancer Mother         Prostate    Heart disease Mother     Hyperlipidemia Mother     Thyroid disease Mother     Dementia Mother      Endometriosis Mother         hysterectomy    Depression Mother     Hypothyroidism Mother     Coronary artery disease Father     Hypertension Father     Hyperlipidemia Father     Prostate cancer Father 65    Cancer Father         LCC    Leukemia Father         CLL    Depression Father     Hearing loss Father     Breast cancer Sister 49    Depression Sister     Hepatitis Sister         C    Heart disease Maternal Grandmother     Heart disease Maternal Grandfather     Heart disease Paternal Grandmother     Diabetes Paternal Grandfather     Heart disease Paternal Grandfather     No Known Problems Daughter     Uterine cancer Neg Hx     Ovarian cancer Neg Hx     Colon cancer Neg Hx       reports that she quit smoking about 9 years ago. Her smoking use included cigarettes. She started smoking about 29 years ago. She has a 10 pack-year smoking history. She has been exposed to tobacco smoke. She has never used smokeless tobacco. She reports that she does not currently use alcohol. She reports that she does not currently use drugs.  Current Outpatient Medications on File Prior to Visit   Medication Sig Dispense Refill    buPROPion (WELLBUTRIN XL) 300 mg 24 hr tablet Take 300 mg by mouth daily      citalopram (CeleXA) 20 mg tablet TAKE 1 TABLET BY MOUTH ONCE DAILY WITH 10MG TABLET      clonazePAM (KlonoPIN) 0.5 mg tablet TAKE 1 TABLET BY MOUTH EVERY DAY AS NEEDED FOR SLEEP      cyclobenzaprine (FLEXERIL) 10 mg tablet TAKE 1 TABLET EVERY 6-8 HOURS BY ORAL ROUTE AS NEEDED, FOR A MUSCLE RELAXANT.      Synthroid 50 MCG tablet TAKE 1 TABLET BY MOUTH IN THE AM 5 DAYS A WEEK AND 2 TABS 2 DAYS A WEEK. **9 TABS TOTAL A WEEK**       No current facility-administered medications on file prior to visit.     Allergies   Allergen Reactions    Amoxicillin Rash    Other Rash and Other (See Comments)     Na    Penicillins Other (See Comments), Rash and Itching     Other reaction(s): yeast infections    Tolerates Keflex    Other reaction(s):  "yeast infections   Tolerates Keflex    Propranolol Rash    Sulfamethoxazole Other (See Comments) and Rash    Sulfamethoxazole-Trimethoprim Rash    Trimethoprim Other (See Comments) and Rash         Objective   /78 (BP Location: Left arm, Patient Position: Sitting, Cuff Size: Large)   Pulse 80   Temp 97.7 °F (36.5 °C) (Temporal)   Resp 16   Ht 5' 5\" (1.651 m)   Wt 80.7 kg (178 lb)   LMP  (LMP Unknown)   SpO2 98%   BMI 29.62 kg/m²     Body mass index is 29.62 kg/m².  Pain Screening:  Pain Score: 0-No pain  ECOG   0  Physical Exam  Vitals reviewed. Exam conducted with a chaperone present.   Constitutional:       General: She is not in acute distress.     Appearance: Normal appearance. She is well-developed. She is not ill-appearing, toxic-appearing or diaphoretic.   HENT:      Head: Normocephalic and atraumatic.   Eyes:      General: No scleral icterus.     Extraocular Movements: Extraocular movements intact.      Conjunctiva/sclera: Conjunctivae normal.   Neck:      Thyroid: No thyromegaly.   Pulmonary:      Effort: Pulmonary effort is normal.   Abdominal:      General: There is no distension.      Palpations: Abdomen is soft. There is no mass.      Tenderness: There is abdominal tenderness. There is no guarding or rebound.      Hernia: No hernia is present.      Comments: Mild tenderness left lower quadrant   Genitourinary:     Comments: External female genitalia normal.  No evidence of mass or lesion.  Speculum examination reveals an atrophic vagina without evidence of mass or lesion.  Bimanual examination reveals a mobile apex.  The pelvic mass is not palpable.  Musculoskeletal:         General: No swelling or tenderness.      Cervical back: Normal range of motion and neck supple.   Lymphadenopathy:      Cervical: No cervical adenopathy.   Skin:     General: Skin is warm and dry.      Coloration: Skin is not jaundiced or pale.      Findings: No lesion or rash.   Neurological:      General: No focal " "deficit present.      Mental Status: She is alert and oriented to person, place, and time. Mental status is at baseline.      Cranial Nerves: No cranial nerve deficit.      Motor: No weakness.      Gait: Gait normal.   Psychiatric:         Mood and Affect: Mood normal.         Behavior: Behavior normal.         Thought Content: Thought content normal.         Judgment: Judgment normal.          Labs: I have reviewed pertinent labs.   No results found for: \"\"  Lab Results   Component Value Date/Time    Potassium 4.6 10/14/2024 11:07 AM    Chloride 102 10/14/2024 11:07 AM    CO2 30 10/14/2024 11:07 AM    BUN 20 10/14/2024 11:07 AM    Creatinine 1.61 (H) 10/14/2024 11:07 AM    Calcium 9.3 10/14/2024 11:07 AM    eGFR 38 (L) 10/14/2024 11:07 AM    eGFR 42 (L) 10/14/2024 11:07 AM      Trend:  No results found for: \"\"    Radiology Results Review : I have reviewed images/report studies in PACS as described above (in the HPI).        "

## 2025-02-08 ENCOUNTER — HOSPITAL ENCOUNTER (OUTPATIENT)
Dept: RADIOLOGY | Facility: HOSPITAL | Age: 55
Discharge: HOME | End: 2025-02-08
Payer: COMMERCIAL

## 2025-02-08 DIAGNOSIS — N83.202 LEFT OVARIAN CYST: ICD-10-CM

## 2025-02-08 PROCEDURE — 71046 X-RAY EXAM CHEST 2 VIEWS: CPT

## 2025-02-13 ENCOUNTER — APPOINTMENT (OUTPATIENT)
Dept: LAB | Facility: HOSPITAL | Age: 55
End: 2025-02-13
Payer: COMMERCIAL

## 2025-02-13 ENCOUNTER — LAB REQUISITION (OUTPATIENT)
Dept: LAB | Facility: HOSPITAL | Age: 55
End: 2025-02-13
Payer: COMMERCIAL

## 2025-02-13 DIAGNOSIS — N83.202 LEFT OVARIAN CYST: ICD-10-CM

## 2025-02-13 DIAGNOSIS — N83.202 UNSPECIFIED OVARIAN CYST, LEFT SIDE: ICD-10-CM

## 2025-02-13 LAB
ABO GROUP BLD: NORMAL
ANION GAP SERPL CALCULATED.3IONS-SCNC: 4 MMOL/L (ref 4–13)
ATRIAL RATE: 67 BPM
BLD GP AB SCN SERPL QL: NEGATIVE
BUN SERPL-MCNC: 14 MG/DL (ref 5–25)
CALCIUM SERPL-MCNC: 9.3 MG/DL (ref 8.4–10.2)
CHLORIDE SERPL-SCNC: 102 MMOL/L (ref 96–108)
CO2 SERPL-SCNC: 31 MMOL/L (ref 21–32)
CREAT SERPL-MCNC: 1.39 MG/DL (ref 0.6–1.3)
ERYTHROCYTE [DISTWIDTH] IN BLOOD BY AUTOMATED COUNT: 12.7 % (ref 11.6–15.1)
GFR SERPL CREATININE-BSD FRML MDRD: 42 ML/MIN/1.73SQ M
GLUCOSE P FAST SERPL-MCNC: 96 MG/DL (ref 65–99)
HCT VFR BLD AUTO: 40 % (ref 34.8–46.1)
HGB BLD-MCNC: 13.5 G/DL (ref 11.5–15.4)
INR PPP: 0.83 (ref 0.85–1.19)
MCH RBC QN AUTO: 30.2 PG (ref 26.8–34.3)
MCHC RBC AUTO-ENTMCNC: 33.8 G/DL (ref 31.4–37.4)
MCV RBC AUTO: 90 FL (ref 82–98)
P AXIS: 53 DEGREES
PLATELET # BLD AUTO: 275 THOUSANDS/UL (ref 149–390)
PMV BLD AUTO: 9.9 FL (ref 8.9–12.7)
POTASSIUM SERPL-SCNC: 4.6 MMOL/L (ref 3.5–5.3)
PR INTERVAL: 150 MS
PROTHROMBIN TIME: 11.9 SECONDS (ref 12.3–15)
QRS AXIS: 25 DEGREES
QRSD INTERVAL: 70 MS
QT INTERVAL: 410 MS
QTC INTERVAL: 433 MS
RBC # BLD AUTO: 4.47 MILLION/UL (ref 3.81–5.12)
RH BLD: POSITIVE
SODIUM SERPL-SCNC: 137 MMOL/L (ref 135–147)
SPECIMEN EXPIRATION DATE: NORMAL
T WAVE AXIS: 29 DEGREES
VENTRICULAR RATE: 67 BPM
WBC # BLD AUTO: 4.55 THOUSAND/UL (ref 4.31–10.16)

## 2025-02-13 PROCEDURE — 80048 BASIC METABOLIC PNL TOTAL CA: CPT

## 2025-02-13 PROCEDURE — 36415 COLL VENOUS BLD VENIPUNCTURE: CPT

## 2025-02-13 PROCEDURE — 93010 ELECTROCARDIOGRAM REPORT: CPT | Performed by: INTERNAL MEDICINE

## 2025-02-13 PROCEDURE — 85027 COMPLETE CBC AUTOMATED: CPT

## 2025-02-13 PROCEDURE — 85610 PROTHROMBIN TIME: CPT

## 2025-02-13 PROCEDURE — 86850 RBC ANTIBODY SCREEN: CPT | Performed by: OBSTETRICS & GYNECOLOGY

## 2025-02-13 PROCEDURE — 86901 BLOOD TYPING SEROLOGIC RH(D): CPT | Performed by: OBSTETRICS & GYNECOLOGY

## 2025-02-13 PROCEDURE — 86900 BLOOD TYPING SEROLOGIC ABO: CPT | Performed by: OBSTETRICS & GYNECOLOGY

## 2025-02-20 ENCOUNTER — ANESTHESIA EVENT (OUTPATIENT)
Dept: PERIOP | Facility: HOSPITAL | Age: 55
End: 2025-02-20
Payer: COMMERCIAL

## 2025-02-20 ENCOUNTER — TELEPHONE (OUTPATIENT)
Age: 55
End: 2025-02-20

## 2025-02-20 NOTE — PRE-PROCEDURE INSTRUCTIONS
Pre-Surgery Instructions:   Medication Instructions    buPROPion (WELLBUTRIN XL) 300 mg 24 hr tablet Take day of surgery.    citalopram (CeleXA) 20 mg tablet Take day of surgery.    clonazePAM (KlonoPIN) 0.5 mg tablet Uses PRN- OK to take day of surgery    cyclobenzaprine (FLEXERIL) 10 mg tablet Uses PRN- OK to take day of surgery    Synthroid 50 MCG tablet Take day of surgery.    Medication instructions for day of surgery reviewed. Please take all instructed medications with only a sip of water.       You will receive a call one business day prior to surgery with an arrival time and hospital directions. If your surgery is scheduled on a Monday, the hospital will be calling you on the Friday prior to your surgery. If you have not heard from anyone by 8pm, please call the hospital supervisor through the hospital  at [phone]. ([place] [phone] or [place] [phone]).    Do not eat or drink anything after midnight the night before your surgery, including candy, mints, lifesavers, or chewing gum. Do not drink alcohol 24hrs before your surgery. Try not to smoke at least 24hrs before your surgery.       Follow the pre surgery showering instructions as listed in the “My Surgical Experience Booklet” or otherwise provided by your surgeon's office. Do not use a blade to shave the surgical area 1 week before surgery. It is okay to use a clean electric clippers up to 24 hours before surgery. Do not apply any lotions, creams, including makeup, cologne, deodorant, or perfumes after showering on the day of your surgery. Do not use dry shampoo, hair spray, hair gel, or any type of hair products.     No contact lenses, eye make-up, or artificial eyelashes. Remove nail polish, including gel polish, and any artificial, gel, or acrylic nails if possible. Remove all jewelry including rings and body piercing jewelry.     Wear causal clothing that is easy to take on and off. Consider your type of surgery.    Keep any  valuables, jewelry, piercings at home. Please bring any specially ordered equipment (sling, braces) if indicated.    Arrange for a responsible person to drive you to and from the hospital on the day of your surgery. Please confirm the visitor policy for the day of your procedure when you receive your phone call with an arrival time.     Call the surgeon's office with any new illnesses, exposures, or additional questions prior to surgery.    Please reference your “My Surgical Experience Booklet” for additional information to prepare for your upcoming surgery.

## 2025-02-20 NOTE — TELEPHONE ENCOUNTER
Received a phone call from patient.  Patient inquiring about bowel prep instructions.  Reviewed instructions with patient.  Patient stated that she was on her way now to  [place].  Patient verbalized understanding.

## 2025-02-21 ENCOUNTER — HOSPITAL ENCOUNTER (OUTPATIENT)
Facility: HOSPITAL | Age: 55
Setting detail: OUTPATIENT SURGERY
Discharge: HOME | End: 2025-02-21
Attending: OBSTETRICS & GYNECOLOGY | Admitting: OBSTETRICS & GYNECOLOGY
Payer: COMMERCIAL

## 2025-02-21 ENCOUNTER — ANESTHESIA (OUTPATIENT)
Dept: PERIOP | Facility: HOSPITAL | Age: 55
End: 2025-02-21
Payer: COMMERCIAL

## 2025-02-21 VITALS
HEART RATE: 72 BPM | SYSTOLIC BLOOD PRESSURE: 135 MMHG | DIASTOLIC BLOOD PRESSURE: 83 MMHG | OXYGEN SATURATION: 98 % | TEMPERATURE: 96.9 F | RESPIRATION RATE: 17 BRPM

## 2025-02-21 DIAGNOSIS — N73.6 PELVIC PERITONEAL ADHESIONS, FEMALE: ICD-10-CM

## 2025-02-21 DIAGNOSIS — N83.202 LEFT OVARIAN CYST: ICD-10-CM

## 2025-02-21 LAB
ABO GROUP BLD: NORMAL
BLD GP AB SCN SERPL QL: NEGATIVE
RH BLD: POSITIVE
SPECIMEN EXPIRATION DATE: NORMAL

## 2025-02-21 PROCEDURE — 86850 RBC ANTIBODY SCREEN: CPT | Performed by: OBSTETRICS & GYNECOLOGY

## 2025-02-21 PROCEDURE — 58661 LAPAROSCOPY REMOVE ADNEXA: CPT | Performed by: OBSTETRICS & GYNECOLOGY

## 2025-02-21 PROCEDURE — 86901 BLOOD TYPING SEROLOGIC RH(D): CPT | Performed by: OBSTETRICS & GYNECOLOGY

## 2025-02-21 PROCEDURE — 88331 PATH CONSLTJ SURG 1 BLK 1SPC: CPT | Performed by: STUDENT IN AN ORGANIZED HEALTH CARE EDUCATION/TRAINING PROGRAM

## 2025-02-21 PROCEDURE — 88307 TISSUE EXAM BY PATHOLOGIST: CPT | Performed by: STUDENT IN AN ORGANIZED HEALTH CARE EDUCATION/TRAINING PROGRAM

## 2025-02-21 PROCEDURE — 86900 BLOOD TYPING SEROLOGIC ABO: CPT | Performed by: OBSTETRICS & GYNECOLOGY

## 2025-02-21 PROCEDURE — S2900 ROBOTIC SURGICAL SYSTEM: HCPCS | Performed by: OBSTETRICS & GYNECOLOGY

## 2025-02-21 PROCEDURE — NC001 PR NO CHARGE: Performed by: OBSTETRICS & GYNECOLOGY

## 2025-02-21 RX ORDER — CEFAZOLIN SODIUM 2 G/50ML
2000 SOLUTION INTRAVENOUS ONCE
Status: COMPLETED | OUTPATIENT
Start: 2025-02-21 | End: 2025-02-21

## 2025-02-21 RX ORDER — SODIUM CHLORIDE 9 MG/ML
INJECTION, SOLUTION INTRAVENOUS CONTINUOUS PRN
Status: DISCONTINUED | OUTPATIENT
Start: 2025-02-21 | End: 2025-02-21

## 2025-02-21 RX ORDER — HEPARIN SODIUM 5000 [USP'U]/ML
5000 INJECTION, SOLUTION INTRAVENOUS; SUBCUTANEOUS
Status: COMPLETED | OUTPATIENT
Start: 2025-02-21 | End: 2025-02-21

## 2025-02-21 RX ORDER — EPHEDRINE SULFATE 50 MG/ML
INJECTION INTRAVENOUS AS NEEDED
Status: DISCONTINUED | OUTPATIENT
Start: 2025-02-21 | End: 2025-02-21

## 2025-02-21 RX ORDER — PROPOFOL 10 MG/ML
INJECTION, EMULSION INTRAVENOUS CONTINUOUS PRN
Status: DISCONTINUED | OUTPATIENT
Start: 2025-02-21 | End: 2025-02-21

## 2025-02-21 RX ORDER — ACETAMINOPHEN 325 MG/1
975 TABLET ORAL EVERY 6 HOURS PRN
Status: DISCONTINUED | OUTPATIENT
Start: 2025-02-21 | End: 2025-02-21 | Stop reason: HOSPADM

## 2025-02-21 RX ORDER — SODIUM CHLORIDE, SODIUM LACTATE, POTASSIUM CHLORIDE, CALCIUM CHLORIDE 600; 310; 30; 20 MG/100ML; MG/100ML; MG/100ML; MG/100ML
75 INJECTION, SOLUTION INTRAVENOUS CONTINUOUS
Status: DISCONTINUED | OUTPATIENT
Start: 2025-02-21 | End: 2025-02-21 | Stop reason: HOSPADM

## 2025-02-21 RX ORDER — CLINDAMYCIN PHOSPHATE 900 MG/50ML
900 INJECTION, SOLUTION INTRAVENOUS ONCE
Status: DISCONTINUED | OUTPATIENT
Start: 2025-02-21 | End: 2025-02-21

## 2025-02-21 RX ORDER — METRONIDAZOLE 500 MG/100ML
500 INJECTION, SOLUTION INTRAVENOUS ONCE
Status: COMPLETED | OUTPATIENT
Start: 2025-02-21 | End: 2025-02-21

## 2025-02-21 RX ORDER — PROPOFOL 10 MG/ML
INJECTION, EMULSION INTRAVENOUS AS NEEDED
Status: DISCONTINUED | OUTPATIENT
Start: 2025-02-21 | End: 2025-02-21

## 2025-02-21 RX ORDER — HYDROMORPHONE HCL/PF 1 MG/ML
0.25 SYRINGE (ML) INJECTION
Status: DISCONTINUED | OUTPATIENT
Start: 2025-02-21 | End: 2025-02-21 | Stop reason: HOSPADM

## 2025-02-21 RX ORDER — HYDROMORPHONE HCL/PF 1 MG/ML
SYRINGE (ML) INJECTION AS NEEDED
Status: DISCONTINUED | OUTPATIENT
Start: 2025-02-21 | End: 2025-02-21

## 2025-02-21 RX ORDER — ROCURONIUM BROMIDE 10 MG/ML
INJECTION, SOLUTION INTRAVENOUS AS NEEDED
Status: DISCONTINUED | OUTPATIENT
Start: 2025-02-21 | End: 2025-02-21

## 2025-02-21 RX ORDER — ALBUMIN HUMAN 50 G/1000ML
SOLUTION INTRAVENOUS CONTINUOUS PRN
Status: DISCONTINUED | OUTPATIENT
Start: 2025-02-21 | End: 2025-02-21

## 2025-02-21 RX ORDER — ONDANSETRON 2 MG/ML
4 INJECTION INTRAMUSCULAR; INTRAVENOUS ONCE AS NEEDED
Status: DISCONTINUED | OUTPATIENT
Start: 2025-02-21 | End: 2025-02-21 | Stop reason: HOSPADM

## 2025-02-21 RX ORDER — DEXAMETHASONE SODIUM PHOSPHATE 10 MG/ML
INJECTION, SOLUTION INTRAMUSCULAR; INTRAVENOUS AS NEEDED
Status: DISCONTINUED | OUTPATIENT
Start: 2025-02-21 | End: 2025-02-21

## 2025-02-21 RX ORDER — FENTANYL CITRATE 50 UG/ML
INJECTION, SOLUTION INTRAMUSCULAR; INTRAVENOUS AS NEEDED
Status: DISCONTINUED | OUTPATIENT
Start: 2025-02-21 | End: 2025-02-21

## 2025-02-21 RX ORDER — METRONIDAZOLE 500 MG/1
500 TABLET ORAL SEE ADMIN INSTRUCTIONS
COMMUNITY

## 2025-02-21 RX ORDER — MIDAZOLAM HYDROCHLORIDE 2 MG/2ML
INJECTION, SOLUTION INTRAMUSCULAR; INTRAVENOUS AS NEEDED
Status: DISCONTINUED | OUTPATIENT
Start: 2025-02-21 | End: 2025-02-21

## 2025-02-21 RX ORDER — ONDANSETRON 2 MG/ML
INJECTION INTRAMUSCULAR; INTRAVENOUS AS NEEDED
Status: DISCONTINUED | OUTPATIENT
Start: 2025-02-21 | End: 2025-02-21

## 2025-02-21 RX ORDER — FENTANYL CITRATE/PF 50 MCG/ML
25 SYRINGE (ML) INJECTION
Status: DISCONTINUED | OUTPATIENT
Start: 2025-02-21 | End: 2025-02-21 | Stop reason: HOSPADM

## 2025-02-21 RX ORDER — LIDOCAINE HYDROCHLORIDE 10 MG/ML
0.5 INJECTION, SOLUTION EPIDURAL; INFILTRATION; INTRACAUDAL; PERINEURAL ONCE AS NEEDED
Status: DISCONTINUED | OUTPATIENT
Start: 2025-02-21 | End: 2025-02-21

## 2025-02-21 RX ORDER — NEOMYCIN SULFATE 500 MG/1
500 TABLET ORAL SEE ADMIN INSTRUCTIONS
COMMUNITY

## 2025-02-21 RX ORDER — SODIUM CHLORIDE, SODIUM LACTATE, POTASSIUM CHLORIDE, CALCIUM CHLORIDE 600; 310; 30; 20 MG/100ML; MG/100ML; MG/100ML; MG/100ML
125 INJECTION, SOLUTION INTRAVENOUS CONTINUOUS
Status: DISCONTINUED | OUTPATIENT
Start: 2025-02-21 | End: 2025-02-21 | Stop reason: HOSPADM

## 2025-02-21 RX ORDER — BUPIVACAINE HYDROCHLORIDE 2.5 MG/ML
INJECTION, SOLUTION EPIDURAL; INFILTRATION; INTRACAUDAL AS NEEDED
Status: DISCONTINUED | OUTPATIENT
Start: 2025-02-21 | End: 2025-02-21 | Stop reason: HOSPADM

## 2025-02-21 RX ORDER — OXYCODONE HYDROCHLORIDE 5 MG/1
5 TABLET ORAL EVERY 6 HOURS PRN
Qty: 10 TABLET | Refills: 0 | Status: SHIPPED | OUTPATIENT
Start: 2025-02-21 | End: 2025-03-03

## 2025-02-21 RX ORDER — LIDOCAINE HYDROCHLORIDE 10 MG/ML
INJECTION, SOLUTION EPIDURAL; INFILTRATION; INTRACAUDAL; PERINEURAL AS NEEDED
Status: DISCONTINUED | OUTPATIENT
Start: 2025-02-21 | End: 2025-02-21

## 2025-02-21 RX ORDER — ACETAMINOPHEN 325 MG/1
975 TABLET ORAL ONCE
Status: COMPLETED | OUTPATIENT
Start: 2025-02-21 | End: 2025-02-21

## 2025-02-21 RX ORDER — MAGNESIUM HYDROXIDE 1200 MG/15ML
LIQUID ORAL AS NEEDED
Status: DISCONTINUED | OUTPATIENT
Start: 2025-02-21 | End: 2025-02-21 | Stop reason: HOSPADM

## 2025-02-21 RX ADMIN — EPHEDRINE SULFATE 10 MG: 50 INJECTION, SOLUTION INTRAVENOUS at 11:56

## 2025-02-21 RX ADMIN — ACETAMINOPHEN 975 MG: 325 TABLET, FILM COATED ORAL at 15:40

## 2025-02-21 RX ADMIN — LIDOCAINE HYDROCHLORIDE 50 MG: 10 INJECTION, SOLUTION EPIDURAL; INFILTRATION; INTRACAUDAL; PERINEURAL at 11:22

## 2025-02-21 RX ADMIN — HYDROMORPHONE HYDROCHLORIDE 1 MG: 1 INJECTION, SOLUTION INTRAMUSCULAR; INTRAVENOUS; SUBCUTANEOUS at 11:28

## 2025-02-21 RX ADMIN — FENTANYL CITRATE 25 MCG: 50 INJECTION INTRAMUSCULAR; INTRAVENOUS at 14:21

## 2025-02-21 RX ADMIN — ONDANSETRON 4 MG: 2 INJECTION INTRAMUSCULAR; INTRAVENOUS at 13:31

## 2025-02-21 RX ADMIN — HYDROMORPHONE HYDROCHLORIDE 0.5 MG: 1 INJECTION, SOLUTION INTRAMUSCULAR; INTRAVENOUS; SUBCUTANEOUS at 13:43

## 2025-02-21 RX ADMIN — SUGAMMADEX 50 MG: 100 INJECTION, SOLUTION INTRAVENOUS at 13:34

## 2025-02-21 RX ADMIN — CEFAZOLIN SODIUM 2000 MG: 2 SOLUTION INTRAVENOUS at 11:16

## 2025-02-21 RX ADMIN — FENTANYL CITRATE 25 MCG: 50 INJECTION INTRAMUSCULAR; INTRAVENOUS at 15:07

## 2025-02-21 RX ADMIN — HEPARIN SODIUM 5000 UNITS: 5000 INJECTION, SOLUTION INTRAVENOUS; SUBCUTANEOUS at 10:27

## 2025-02-21 RX ADMIN — ROCURONIUM 20 MG: 50 INJECTION, SOLUTION INTRAVENOUS at 12:57

## 2025-02-21 RX ADMIN — METRONIDAZOLE: 500 SOLUTION INTRAVENOUS at 11:29

## 2025-02-21 RX ADMIN — SODIUM CHLORIDE: 9 INJECTION, SOLUTION INTRAVENOUS at 11:27

## 2025-02-21 RX ADMIN — ALBUMIN (HUMAN): 12.5 INJECTION, SOLUTION INTRAVENOUS at 11:56

## 2025-02-21 RX ADMIN — PROPOFOL 150 MG: 10 INJECTION, EMULSION INTRAVENOUS at 11:22

## 2025-02-21 RX ADMIN — SODIUM CHLORIDE, SODIUM LACTATE, POTASSIUM CHLORIDE, AND CALCIUM CHLORIDE: .6; .31; .03; .02 INJECTION, SOLUTION INTRAVENOUS at 11:14

## 2025-02-21 RX ADMIN — PROPOFOL 120 MCG/KG/MIN: 10 INJECTION, EMULSION INTRAVENOUS at 11:30

## 2025-02-21 RX ADMIN — ROCURONIUM 50 MG: 50 INJECTION, SOLUTION INTRAVENOUS at 11:22

## 2025-02-21 RX ADMIN — PHENYLEPHRINE HYDROCHLORIDE 40 MCG/MIN: 10 INJECTION INTRAVENOUS at 12:02

## 2025-02-21 RX ADMIN — FENTANYL CITRATE 25 MCG: 50 INJECTION INTRAMUSCULAR; INTRAVENOUS at 11:24

## 2025-02-21 RX ADMIN — DEXAMETHASONE SODIUM PHOSPHATE 10 MG: 10 INJECTION, SOLUTION INTRAMUSCULAR; INTRAVENOUS at 11:30

## 2025-02-21 RX ADMIN — SUGAMMADEX 100 MG: 100 INJECTION, SOLUTION INTRAVENOUS at 13:36

## 2025-02-21 RX ADMIN — MIDAZOLAM 2 MG: 1 INJECTION INTRAMUSCULAR; INTRAVENOUS at 11:16

## 2025-02-21 RX ADMIN — FENTANYL CITRATE 25 MCG: 50 INJECTION INTRAMUSCULAR; INTRAVENOUS at 11:22

## 2025-02-21 RX ADMIN — FENTANYL CITRATE 50 MCG: 50 INJECTION INTRAMUSCULAR; INTRAVENOUS at 11:27

## 2025-02-21 RX ADMIN — ACETAMINOPHEN 975 MG: 325 TABLET, FILM COATED ORAL at 10:29

## 2025-02-21 RX ADMIN — ROCURONIUM 20 MG: 50 INJECTION, SOLUTION INTRAVENOUS at 11:49

## 2025-02-21 NOTE — ASSESSMENT & PLAN NOTE
Lab Results   Component Value Date    EGFR 42 02/13/2025    EGFR 38 (L) 10/14/2024    EGFR 42 (L) 10/14/2024    CREATININE 1.39 (H) 02/13/2025    CREATININE 1.61 (H) 10/14/2024    CREATININE 1.58 (H) 03/07/2022   Serum creatinine has been relatively stable.  Will continue to monitor.  This is likely secondary to donor nephrectomy status.

## 2025-02-21 NOTE — H&P
H&P - GYN Oncology   Name: Elvie Mederos 55 y.o. female I MRN: [id number]  Unit/Bed#: OR POOL I Date of Admission: 2025   Date of Service: 2025 I Hospital Day: 0     Assessment & Plan  Other ovarian cyst, left side  55-year-old with multiple prior pelvic and abdominal surgeries including 2 previous  sections, laparoscopic right donor nephrectomy, right ovarian cystectomy, right oophorectomy, hysterectomy with extensive lysis of adhesions now with a 9 x 6 x 4.1 cm complex left ovarian cyst, pelvic pain.  She presents for definitive operative management.  1.  Plan for examination under anesthesia, robotic assisted laparoscopic left oophorectomy, possible exploratory laparotomy and all other indicated procedures.  Pelvic peritoneal adhesions, female  Secondary to multiple previous surgeries.  Will plan for robotic assisted surgery with possible exploratory laparotomy.  She understands the additional risks of having pelvic peritoneal adhesions including additional risks of bowel and bladder injury.  H/O right nephrectomy  Continue to monitor  Stage 3b chronic kidney disease (HCC)  Lab Results   Component Value Date    EGFR 42 2025    EGFR 38 (L) 10/14/2024    EGFR 42 (L) 10/14/2024    CREATININE 1.39 (H) 2025    CREATININE 1.61 (H) 10/14/2024    CREATININE 1.58 (H) 2022   Serum creatinine has been relatively stable.  Will continue to monitor.  This is likely secondary to donor nephrectomy status.    History of Present Illness   Chief Complaint: Here for surgery  Elvie Mederos is a 55 y.o. female who presents with a complex 9 x 6 cm right ovarian cyst and multiple previous pelvic surgeries.  She now notes pelvic pain which is new in the interval from her previous office visit.  She is otherwise able to perform her actives of daily without difficulty.        Review of Systems   Constitutional:  Negative for activity change and unexpected weight change.   HENT: Negative.     Eyes:  Negative.    Respiratory: Negative.     Cardiovascular: Negative.    Gastrointestinal:  Positive for abdominal pain. Negative for abdominal distention.   Endocrine: Negative.    Genitourinary:  Positive for pelvic pain. Negative for vaginal bleeding.   Musculoskeletal: Negative.    Skin: Negative.    Allergic/Immunologic: Negative.    Neurological: Negative.    Hematological: Negative.    Psychiatric/Behavioral: Negative.       Historical Information   Past Medical History:   Diagnosis Date    Anxiety     Basal cell carcinoma (BCC) of skin of upper extremity including shoulder     BRCA1 negative     BRCA2 negative     Cancer (HCC)     Basal cell    Colon polyp 2010    Depression 2012    Disease of thyroid gland 2020    Hypo    Gastric polyps     Hashimoto's disease     Hyperthyroidism 2019    Lactose intolerance 1988     Past Surgical History:   Procedure Laterality Date    ABDOMINAL SURGERY          AUGMENTATION BREAST      AUGMENTATION MAMMAPLASTY      BASAL CELL CARCINOMA EXCISION Bilateral     shoulders    CARPAL TUNNEL RELEASE       SECTION      x2; ,     COLONOSCOPY  2020    h/o polyps, [facility] in [facility] past  [facility]    COLONOSCOPY  2015    [place]    COLONOSCOPY  2010ish    DEBRIDEMENT TENNIS ELBOW Bilateral     HYSTERECTOMY  2015    Left Salpingectomy    NEPHRECTOMY LIVING DONOR Right 2022    OOPHORECTOMY Right     cystic; Dr Obrien;  with lysis of adhesion     Social History     Tobacco Use    Smoking status: Former     Current packs/day: 0.00     Average packs/day: 0.5 packs/day for 20.0 years (10.0 ttl pk-yrs)     Types: Cigarettes     Start date: 1996     Quit date: 2016     Years since quittin.1     Passive exposure: Past    Smokeless tobacco: Never   Vaping Use    Vaping status: Never Used   Substance and Sexual Activity    Alcohol use: Not Currently     Comment: ocassionally    Drug use: Not Currently    Sexual activity:  Not Currently     Partners: Male     Birth control/protection: Other     Comment: Don’t care     E-Cigarette/Vaping    E-Cigarette Use Never User      E-Cigarette/Vaping Substances    Nicotine No     THC No     CBD No     Flavoring No     Other No     Unknown No      Noncontributory  Oncology History:   Cancer Staging   No matching staging information was found for the patient.    Oncology History    No history exists.     Current Facility-Administered Medications   Medication Dose Route Frequency Provider Last Rate Last Admin    ceFAZolin (ANCEF) IVPB (premix in dextrose) 2,000 mg 50 mL  2,000 mg Intravenous Once Raquel Moreno MD        lactated ringers infusion  125 mL/hr Intravenous Continuous Galileo Gonsalves MD        lidocaine (PF) (XYLOCAINE-MPF) 1 % injection 0.5 mL  0.5 mL Infiltration Once PRN Peyton Osorio MD        metroNIDAZOLE (FLAGYL) IVPB (premix) 500 mg 100 mL  500 mg Intravenous Once Raquel Moreno MD           Objective :  Temp:  [97.8 °F (36.6 °C)] 97.8 °F (36.6 °C)  HR:  [57] 57  BP: (125)/(73) 125/73  Resp:  [16] 16  SpO2:  [99 %] 99 %  O2 Device: None (Room air)    Physical Exam  Vitals reviewed.   Constitutional:       General: She is not in acute distress.     Appearance: Normal appearance. She is normal weight. She is not ill-appearing, toxic-appearing or diaphoretic.   HENT:      Head: Normocephalic and atraumatic.   Eyes:      General: No scleral icterus.     Extraocular Movements: Extraocular movements intact.      Conjunctiva/sclera: Conjunctivae normal.   Cardiovascular:      Rate and Rhythm: Normal rate and regular rhythm.      Heart sounds: Normal heart sounds.   Pulmonary:      Effort: Pulmonary effort is normal.      Breath sounds: Normal breath sounds.   Abdominal:      Palpations: Abdomen is soft.   Musculoskeletal:      Cervical back: Normal range of motion.      Right lower leg: No edema.      Left lower leg: No edema.   Skin:     General: Skin is warm and dry.  "  Neurological:      General: No focal deficit present.      Mental Status: She is alert and oriented to person, place, and time. Mental status is at baseline.   Psychiatric:         Mood and Affect: Mood normal.         Behavior: Behavior normal.         Thought Content: Thought content normal.         Judgment: Judgment normal.           Lab Results: I have reviewed the following results:  No results found for: \"\"  Lab Results   Component Value Date    K 4.6 02/13/2025     02/13/2025    CO2 31 02/13/2025    BUN 14 02/13/2025    CREATININE 1.39 (H) 02/13/2025    GLUF 96 02/13/2025    CALCIUM 9.3 02/13/2025    AST 33 02/07/2022    ALT 38 02/07/2022    ALKPHOS 95 02/07/2022    EGFR 42 02/13/2025     Lab Results   Component Value Date    WBC 4.55 02/13/2025    HGB 13.5 02/13/2025    HCT 40.0 02/13/2025    MCV 90 02/13/2025     02/13/2025     No results found for: \"NEUTROABS\"     Trend:  No results found for: \"\"          "

## 2025-02-21 NOTE — DISCHARGE INSTR - AVS FIRST PAGE
Follow-up  Please follow up as scheduled with Dr. Gonsalves on 3/6 at 9:45am  Make an appointment to follow up with your primary care physician in the next 1-2 weeks.  Call the office if you have increased pain not relieved with pain medicine.  Call the office if you have a fever,redness, the wound opens up, you have pus draining from your incision.     Wound care  If you have adhesive wound closure, do not rub or pick off. No dressing is needed over top. Please monitor for redness around incision, drainage, or foul odor.    Activity  No driving until seen in the office.   No driving while taking pain meds.   No heavy lifting or strenuous exercise until you are cleared in the surgery office     Shower  You may shower and get incisions wet, wash with soap and water, do not scrub, rinse, and pat dry.   No baths, swimming pools, or hot tubs, until you follow up with Dr. Gonsalves     Medication  If you do not need strong pain medicine you may take Tylenol.   Do not take acetaminophen (Tylenol) WITH  pain meds that contain acetaminophen. Take one or the other.  Do not exceed more than 4000 mg of acetaminophen in 24 hours or 3000 mg if you have liver disease.

## 2025-02-21 NOTE — ASSESSMENT & PLAN NOTE
Secondary to multiple previous surgeries.  Will plan for robotic assisted surgery with possible exploratory laparotomy.  She understands the additional risks of having pelvic peritoneal adhesions including additional risks of bowel and bladder injury.

## 2025-02-21 NOTE — ANESTHESIA POSTPROCEDURE EVALUATION
Post-Op Assessment Note    CV Status:  Stable  Pain Score: 0    Pain management: adequate       Mental Status:  Sleepy and arousable   Hydration Status:  Euvolemic   PONV Controlled:  Controlled   Airway Patency:  Patent     Post Op Vitals Reviewed: Yes    No anethesia notable event occurred.    Staff: CRNA, Anesthesiologist   Comments: Report given to recovering RN, VSS, Pt resting.      Last Filed PACU Vitals:  Vitals Value Taken Time   Temp 97.2 °F (36.2 °C) 02/21/25 1358   Pulse 92 02/21/25 1409   /89 02/21/25 1405   Resp 18 02/21/25 1409   SpO2 95 % 02/21/25 1411   Vitals shown include unfiled device data.    Modified [place]:     Vitals Value Taken Time   Activity 2 02/21/25 1400   Respiration 2 02/21/25 1400   Circulation 2 02/21/25 1400   Consciousness 0 02/21/25 1400   Oxygen Saturation 2 02/21/25 1400     Modified [place] Score: 8

## 2025-02-21 NOTE — ANESTHESIA POSTPROCEDURE EVALUATION
Post-Op Assessment Note    CV Status:  Stable    Pain management: adequate       Mental Status:  Alert and awake   Hydration Status:  Euvolemic and stable   PONV Controlled:  None   Airway Patency:  Patent and adequate  Two or more mitigation strategies used for obstructive sleep apnea   Post Op Vitals Reviewed: Yes    No anethesia notable event occurred.    Staff: Anesthesiologist, with CRNAs           Last Filed PACU Vitals:  Vitals Value Taken Time   Temp 97.2 °F (36.2 °C) 02/21/25 1358   Pulse 83 02/21/25 1433   /76 02/21/25 1430   Resp 18 02/21/25 1433   SpO2 95 % 02/21/25 1433   Vitals shown include unfiled device data.    Modified [place]:     Vitals Value Taken Time   Activity 2 02/21/25 1430   Respiration 2 02/21/25 1430   Circulation 2 02/21/25 1430   Consciousness 1 02/21/25 1430   Oxygen Saturation 2 02/21/25 1430     Modified [place] Score: 9

## 2025-02-21 NOTE — OP NOTE
OPERATIVE REPORT  PATIENT NAME: Elvie Mederos    :  1970  MRN: [id number]  Pt Location: [facility] OR ROOM 15    SURGERY DATE: 2025    Surgeons and Role:     * Galileo Gonsalves MD - Primary     * Wanda Romayne Stengel Hohenshilt, PA-C - Assisting     * Marilia Vallejo DO - Assisting     * Raquel Moreno MD - Assisting    Preop Diagnosis:  Pelvic peritoneal adhesions, female [N73.6]  Left ovarian cyst [N83.202]    Post-Op Diagnosis Codes:     * Pelvic peritoneal adhesions, female [N73.6]     * Left ovarian cyst [N83.202]    Procedure(s):  Left - ROBOTIC ASSISTED RADICAL LEFT OOPHORECTOMY. LYSIS OF ABDOMINAL AND PELVIC ADHESIONS. VAGINOTOMY. CYSTOSCOPY    Specimen(s):  ID Type Source Tests Collected by Time Destination   1 : left ovary Tissue Ovary, Left TISSUE EXAM Galileo Gonsalves MD 2025 12:55 PM        Estimated Blood Loss:   Minimal    Drains:  [REMOVED] Urethral Catheter Non-latex;Straight-tip 16 Fr. (Removed)   Number of days: 0       Anesthesia Type:   General    Operative Indications:  Pelvic peritoneal adhesions, female [N73.6]  Left ovarian cyst [N83.202]  55-year-old with a 9 x 6 cm enlarging left ovarian cyst.  She has a history of multiple prior pelvic surgeries and presented for definitive operative management and potential surgical staging.    Operative Findings:  1.  Examination under anesthesia revealed a grossly normal vagina.  The mass was not palpable transvaginally.  2.  On laparoscopy, there were extensive adhesions noted from the omentum to the anterior abdominal wall which extended from the falciform ligament across the abdomen into the pelvis.  The omentum was also adherent to the bladder serosa, right pelvic sidewall, left pelvic sidewall.  There were adhesions present from the distal sigmoid colon to the left pelvic sidewall, adhesions from the rectum to the left pelvic sidewall.  The ovary was adherent to the left pelvic sidewall, left ureter, left parametria,  bladder serosa.  An extensive dissection of the left pelvic sidewall was necessary to remove the left ovary inclusive of dissection of the left ureter from the pelvic brim to past the level of the uterine vasculature.  The left ureter was released from its medial peritoneal attachments.  Therefore, this was a radical left oophorectomy.  3.  Frozen section of the left ovary revealed benign disease.  4.  Cystoscopy revealed a left ureteral jet and no evidence of bladder injury.  5.  A vaginotomy was performed for specimen removal.  Additional difficulty was incurred in the performance of this procedure secondary to the extensive abdominal and pelvic adhesive disease.  A normal left oophorectomy will take approximately 30 minutes laparoscopically.  This was at least 3 times as difficult as a normal laparoscopic oophorectomy.  Approximately 30 minutes was spent lysing omental adhesions and additional 30 minutes was spent lysing adhesions from the ovary to the left pelvic sidewall, ureter, parametria.        Complications:   None    Procedure and Technique:  After informed consent was obtained, the patient was taken to the operating room where general endotracheal anesthesia was administered without incident.  She was then prepped and draped in the normal sterile fashion in the low dorsolithotomy position.  Examination under anesthesia was then performed with findings noted as above.  A Daley catheter was inserted.  Tension was then turned to the abdomen.  0.25% Marcaine was used to infiltrate the skin prior to placement of any trocar.  The first insertion site was approximately 4 cm superior to the umbilicus in the midline.  An 8 mm skin incision was made using an 11 blade scalpel.  Through this was passed an 8 mm trocar under direct visualization into the abdominal cavity.  The abdomen is then insufflated to 15 mmHg using CO2 gas.  Findings on laparoscopy are noted as above.  A left upper quadrant 8 mm robotic trocar  was then able to be placed just below the costal margin.  Using this trocar site, an additional 8 mm trocar was able to be placed in the left upper quadrant approximately 8 cm medial to the initial site.  Using these 3 trocars, omentum was taken down from the anterior abdominal wall using sharp dissection as well as the Enseal bipolar cautery device.  It was also taken down from the right upper quadrant.  This allowed for the 2 right upper quadrant trocars to be placed.  A 8 mm robotic trocar as well as a 5 mm assistant port were then placed under direct visualization.  Additional adhesiolysis was necessary using the Enseal in order to adequately docked the robot.  The robot was then able to be docked.  The adhesiolysis then continued.  Omentum was taken down from anterior abdominal wall all the way to the bladder.  The adhesions to the left pelvic sidewall were then taken down.  Omental adhesions to the right side were also taken down.  Once the omentum was fully released, the pelvis was able to be visualized.  There were adhesions present from the distal sigmoid colon to the left pelvic sidewall which were taken down.  The rectum was released from the left pelvic sidewall as well.  This was performed using monopolar cautery.  The left retroperitoneal space was then able to be opened.  The pararectal space was developed.  The left ureter was identified coursing normally within the medial leaf of the broad ligament.  A window was made above the ureter and the peritoneum and the infundibulopelvic ligament was then skeletonized, cauterized and transected.  Adhesions from the ovary to the left pelvic sidewall were then lysed.  The ureter was then released from medial peritoneal attachments and lateralized.  The peritoneum was then able to be cauterized and transected above the ureter to release the ovary.  The ovary was also densely adherent to the left residual parametria/cardinal ligament.  There was bleeding noted  at this site.  The ureter was tracked all the way to the level of the uterine vasculature and dissected free of the uterine vasculature which was cauterized lateral to the ureter.  Additional cautery was necessary medial to the ureter.  The ureter was noted to be well away from these areas of cautery.  Hemostasis was then achieved.  Adhesions from the ovary to the bladder serosa were lysed and the ovary was freed.  The vesicovaginal space was then developed by placing an EEA sizer in the vagina and dissecting the bladder away from the vaginal apex.  An incision was made on the vaginal apex with the EEA sizer as a guide and a large anchor bag was then placed into the abdomen transvaginally.  The left ovary was then placed in the bag.  The bag was exteriorized.  The cyst was ruptured in the bag.  There was no rupture of any contents in the abdomen or pelvis.  The ovary was then removed and sent for frozen section analysis which revealed the above-mentioned findings.  A 2-0 STRATAFIX suture was then advanced into the pelvis and used to close the vagina in a running fashion.  Hemostasis was noted to be adequate.  The pelvis was irrigated.  The pressure in the pelvis was brought down to less than 5 mmHg.  There was no active bleeding identified.  Surgiflo was then applied to the left parametria.  The robot was undocked.  The gas was removed from abdominal cavity.  The ports were then removed under direct visualization.  The skin was then closed at all sites using 4-0 Monocryl followed by Exofin.  The Daley catheter was removed.  The bladder was insufflated with 200 cc of normal saline and the 5 mm laparoscope was then used as a cystoscope with findings noted as above.  The bladder was then drained.  The vagina was inspected.  There is no evidence of bleeding identified.  The patient was then awakened and transferred to the recovery room in stable condition.  All instrument counts correct x 2 for procedure.  No  complications.  Estimated blood loss is less than 50 mL.   I was present for the entire procedure. and A physician assistant was required during the procedure for retraction, tissue handling, dissection and suturing.    Patient Disposition:  PACU              SIGNATURE: Galileo Gonsalves MD  DATE: February 21, 2025  TIME: 1:59 PM

## 2025-02-21 NOTE — ASSESSMENT & PLAN NOTE
55-year-old with multiple prior pelvic and abdominal surgeries including 2 previous  sections, laparoscopic right donor nephrectomy, right ovarian cystectomy, right oophorectomy, hysterectomy with extensive lysis of adhesions now with a 9 x 6 x 4.1 cm complex left ovarian cyst, pelvic pain.  She presents for definitive operative management.  1.  Plan for examination under anesthesia, robotic assisted laparoscopic left oophorectomy, possible exploratory laparotomy and all other indicated procedures.

## 2025-02-21 NOTE — ANESTHESIA PREPROCEDURE EVALUATION
Procedure:  ROBOTIC ASSISTED LAPAROSCOPIC LEFT OOPHORECTOMY, EXAM UNDER ANESTHESIA, POSSIBLE EXPLORATORY LAPAROTOMY (Left: Abdomen)  LAPAROTOMY EXPLORATORY W/ ROBOTICS (Abdomen)    Relevant Problems   ANESTHESIA (within normal limits)   (-) History of anesthesia complications      CARDIO (within normal limits)      ENDO   (+) Hypothyroidism due to acquired atrophy of thyroid      GI/HEPATIC   (+) GERD (gastroesophageal reflux disease)      /RENAL   (+) Stage 3b chronic kidney disease (HCC)      HEMATOLOGY (within normal limits)      MUSCULOSKELETAL (within normal limits)      NEURO/PSYCH (within normal limits)      PULMONARY (within normal limits)      Surgery/Wound/Pain   (+) H/O right nephrectomy      Other   (+) Donor of kidney for transplant        Physical Exam    Airway    Mallampati score: I  TM Distance: >3 FB  Neck ROM: full     Dental   No notable dental hx     Cardiovascular  Rhythm: regular, Rate: normal, Cardiovascular exam normal    Pulmonary  Pulmonary exam normal Breath sounds clear to auscultation    Other Findings  post-pubertal.      Anesthesia Plan  ASA Score- 2     Anesthesia Type- general with ASA Monitors.         Additional Monitors:     Airway Plan: ETT.           Plan Factors-Exercise tolerance (METS): >4 METS.    Chart reviewed. EKG reviewed.  Existing labs reviewed. Patient summary reviewed.    Patient is not a current smoker.  Patient did not smoke on day of surgery.            Induction- intravenous.    Postoperative Plan- . Planned trial extubation    Perioperative Resuscitation Plan - Level 1 - Full Code.       Informed Consent- Anesthetic plan and risks discussed with patient and son.  I personally reviewed this patient with the CRNA. Discussed and agreed on the Anesthesia Plan with the CRNA..      NPO Status:  No vitals data found for the desired time range.    NPO and allergies verified.    Plan:  GETA    Risks and benefits of general anesthesia were discussed with the patient.   Discussed risks of anesthesia including, but not limited to, the risk of dental injury, n/v, sore throat, corneal abrasions, and arrhythmias.  All questions were answered.  Anesthesia consent was obtained from the patient.

## 2025-02-25 ENCOUNTER — RESULTS FOLLOW-UP (OUTPATIENT)
Dept: GYNECOLOGIC ONCOLOGY | Facility: CLINIC | Age: 55
End: 2025-02-25

## 2025-03-06 ENCOUNTER — RESULTS FOLLOW-UP (OUTPATIENT)
Age: 55
End: 2025-03-06

## 2025-03-06 ENCOUNTER — APPOINTMENT (OUTPATIENT)
Dept: LAB | Facility: HOSPITAL | Age: 55
End: 2025-03-06
Payer: COMMERCIAL

## 2025-03-06 ENCOUNTER — OFFICE VISIT (OUTPATIENT)
Age: 55
End: 2025-03-06

## 2025-03-06 VITALS
OXYGEN SATURATION: 98 % | DIASTOLIC BLOOD PRESSURE: 74 MMHG | WEIGHT: 167.2 LBS | RESPIRATION RATE: 16 BRPM | BODY MASS INDEX: 27.86 KG/M2 | HEART RATE: 81 BPM | SYSTOLIC BLOOD PRESSURE: 126 MMHG | HEIGHT: 65 IN | TEMPERATURE: 97.9 F

## 2025-03-06 DIAGNOSIS — R11.14 BILIOUS VOMITING WITH NAUSEA: ICD-10-CM

## 2025-03-06 DIAGNOSIS — N83.292 OTHER OVARIAN CYST, LEFT SIDE: Primary | ICD-10-CM

## 2025-03-06 LAB
ALBUMIN SERPL BCG-MCNC: 4.2 G/DL (ref 3.5–5)
ALP SERPL-CCNC: 52 U/L (ref 34–104)
ALT SERPL W P-5'-P-CCNC: 14 U/L (ref 7–52)
ANION GAP SERPL CALCULATED.3IONS-SCNC: 7 MMOL/L (ref 4–13)
AST SERPL W P-5'-P-CCNC: 18 U/L (ref 13–39)
BASOPHILS # BLD AUTO: 0.03 THOUSANDS/ÂΜL (ref 0–0.1)
BASOPHILS NFR BLD AUTO: 1 % (ref 0–1)
BILIRUB SERPL-MCNC: 0.39 MG/DL (ref 0.2–1)
BUN SERPL-MCNC: 11 MG/DL (ref 5–25)
CALCIUM SERPL-MCNC: 9.2 MG/DL (ref 8.4–10.2)
CHLORIDE SERPL-SCNC: 106 MMOL/L (ref 96–108)
CO2 SERPL-SCNC: 29 MMOL/L (ref 21–32)
CREAT SERPL-MCNC: 1.36 MG/DL (ref 0.6–1.3)
EOSINOPHIL # BLD AUTO: 0.1 THOUSAND/ÂΜL (ref 0–0.61)
EOSINOPHIL NFR BLD AUTO: 2 % (ref 0–6)
ERYTHROCYTE [DISTWIDTH] IN BLOOD BY AUTOMATED COUNT: 13.1 % (ref 11.6–15.1)
GFR SERPL CREATININE-BSD FRML MDRD: 43 ML/MIN/1.73SQ M
GLUCOSE SERPL-MCNC: 99 MG/DL (ref 65–140)
HCT VFR BLD AUTO: 42.6 % (ref 34.8–46.1)
HGB BLD-MCNC: 13.7 G/DL (ref 11.5–15.4)
IMM GRANULOCYTES # BLD AUTO: 0.02 THOUSAND/UL (ref 0–0.2)
IMM GRANULOCYTES NFR BLD AUTO: 0 % (ref 0–2)
LYMPHOCYTES # BLD AUTO: 1.16 THOUSANDS/ÂΜL (ref 0.6–4.47)
LYMPHOCYTES NFR BLD AUTO: 22 % (ref 14–44)
MCH RBC QN AUTO: 29.1 PG (ref 26.8–34.3)
MCHC RBC AUTO-ENTMCNC: 32.2 G/DL (ref 31.4–37.4)
MCV RBC AUTO: 91 FL (ref 82–98)
MONOCYTES # BLD AUTO: 0.53 THOUSAND/ÂΜL (ref 0.17–1.22)
MONOCYTES NFR BLD AUTO: 10 % (ref 4–12)
NEUTROPHILS # BLD AUTO: 3.36 THOUSANDS/ÂΜL (ref 1.85–7.62)
NEUTS SEG NFR BLD AUTO: 65 % (ref 43–75)
NRBC BLD AUTO-RTO: 0 /100 WBCS
PLATELET # BLD AUTO: 361 THOUSANDS/UL (ref 149–390)
PMV BLD AUTO: 9.5 FL (ref 8.9–12.7)
POTASSIUM SERPL-SCNC: 4.3 MMOL/L (ref 3.5–5.3)
PROT SERPL-MCNC: 6.8 G/DL (ref 6.4–8.4)
RBC # BLD AUTO: 4.7 MILLION/UL (ref 3.81–5.12)
SODIUM SERPL-SCNC: 142 MMOL/L (ref 135–147)
WBC # BLD AUTO: 5.2 THOUSAND/UL (ref 4.31–10.16)

## 2025-03-06 PROCEDURE — 99024 POSTOP FOLLOW-UP VISIT: CPT | Performed by: OBSTETRICS & GYNECOLOGY

## 2025-03-06 PROCEDURE — 80053 COMPREHEN METABOLIC PANEL: CPT

## 2025-03-06 PROCEDURE — 85025 COMPLETE CBC W/AUTO DIFF WBC: CPT

## 2025-03-06 PROCEDURE — 36415 COLL VENOUS BLD VENIPUNCTURE: CPT

## 2025-03-06 NOTE — LETTER
2025     Kelley Melton MD  [address]  [address]  [facility] [address]    Patient: Elvie Mederos   YOB: 1970   Date of Visit: 3/6/2025       Dear Dr. Melton:    Thank you for referring Elvie Mederos to me for evaluation. Below are my notes for this consultation.    If you have questions, please do not hesitate to call me. I look forward to following your patient along with you.         Sincerely,        Galileo Gonsalves MD        CC: No Recipients    Galileo Gonsalves MD  3/6/2025  1:33 PM  Sign when Signing Visit  Name: Elvie Mederos      : 1970      MRN: [id number]  Encounter Provider: Galileo Gonsalves MD  Encounter Date: 3/6/2025   Encounter department: [facility] GYNECOLOGY [facility]  :  Assessment & Plan  Other ovarian cyst, left side  55-year-old status post radical robotic left oophorectomy, lysis of adhesions, vaginotomy, cystoscopy on 2025 for a serous cystadenoma of the left ovary.  She has postoperative constipation/diarrhea, vomiting.  Her performance status is 1.  1.  CBC, CMP  2.  CT abdomen pelvis without IV contrast to assess for postoperative hematoma/abscess/fluid collection  3.  We discussed ongoing activity limitations  4.  Return in 4 weeks for postoperative pelvic examination       Bilious vomiting with nausea  Had vomiting after eating half of steak on Thursday.  This has resolved.  She is also constipated with intermittent diarrhea.  Will assess CT abdomen pelvis    Orders:  •  CBC and differential; Future  •  Comprehensive metabolic panel; Future  •  CT abdomen pelvis wo contrast; Future            History of Present Illness  Reason for Visit / CC: Postoperative evaluation, fatigue, constipation, vomiting   Elvie Mederos is a 55 y.o. female   Returns for postoperative evaluation.  She noted fevers to 100.7 and 100.9 immediately after surgery.  These have resolved.  She has also had a poor  appetite.  She has been tolerating some liquids.  She had extensive vomiting on Thursday night with nausea.  This has resolved.  She has constipation but occasionally has loose bowel movements.  She also has discomfort at the end of the urinary stream.  She had some vaginal spotting immediately after surgery as well which has resolved.  She is ambulating.  He does not require narcotics.  No other interval change in medications or medical history since her surgery.      Pertinent Medical History         Review of Systems A complete review of systems is negative other than that noted above in the HPI.  Past Medical History  Past Medical History:   Diagnosis Date   • Anxiety    • Basal cell carcinoma (BCC) of skin of upper extremity including shoulder    • BRCA1 negative    • BRCA2 negative    • Cancer (HCC) 2014    Basal cell   • Colon polyp 2010   • Depression    • Disease of thyroid gland 2020    Hypo   • Gastric polyps    • Hashimoto's disease    • Hyperthyroidism 2019   • Lactose intolerance      Past Surgical History:   Procedure Laterality Date   • ABDOMINAL SURGERY         • AUGMENTATION BREAST     • AUGMENTATION MAMMAPLASTY     • BASAL CELL CARCINOMA EXCISION Bilateral     shoulders   • CARPAL TUNNEL RELEASE     •  SECTION      x2; ,    • COLONOSCOPY  2020    h/o polyps, 3 in the past  [facility]   • COLONOSCOPY  2015    [facility]   • COLONOSCOPY  2010ish   • DEBRIDEMENT TENNIS ELBOW Bilateral    • HYSTERECTOMY  2015    Left Salpingectomy   • NEPHRECTOMY LIVING DONOR Right 2022   • OOPHORECTOMY Right     cystic; Dr Obrien;  with lysis of adhesion   • WY LAPS FULG/EXC OVARY VISCERA/PERITONEAL SURFACE Left 2025    Procedure: ROBOTIC ASSISTED RADICAL LEFT OOPHORECTOMY, LYSIS OF ABDOMINAL AND PELVIC ADHESIONS, VAGINOTOMY, CYSTOSCOPY;  Surgeon: Galileo Gonsalves MD;  Location: [facility];  Service: Gynecology Oncology     Family  History   Problem Relation Age of Onset   • Asthma Mother         Svitlana Kwan   • Cancer Mother         Prostate   • Heart disease Mother    • Hyperlipidemia Mother    • Thyroid disease Mother    • Dementia Mother    • Endometriosis Mother         hysterectomy   • Depression Mother    • Hypothyroidism Mother    • Coronary artery disease Father    • Hypertension Father    • Hyperlipidemia Father    • Prostate cancer Father 65   • Cancer Father         LCC   • Leukemia Father         CLL   • Depression Father    • Hearing loss Father    • Breast cancer Sister 49   • Depression Sister    • Hepatitis Sister         C   • Heart disease Maternal Grandmother    • Heart disease Maternal Grandfather    • Heart disease Paternal Grandmother    • Diabetes Paternal Grandfather    • Heart disease Paternal Grandfather    • No Known Problems Daughter    • Uterine cancer Neg Hx    • Ovarian cancer Neg Hx    • Colon cancer Neg Hx       reports that she quit smoking about 9 years ago. Her smoking use included cigarettes. She started smoking about 29 years ago. She has a 10 pack-year smoking history. She has been exposed to tobacco smoke. She has never used smokeless tobacco. She reports that she does not currently use alcohol. She reports that she does not currently use drugs.  Current Outpatient Medications   Medication Instructions   • buPROPion (WELLBUTRIN XL) 300 mg, Daily   • citalopram (CELEXA) 40 mg, Daily   • clonazePAM (KlonoPIN) 0.5 mg tablet TAKE 1 TABLET BY MOUTH EVERY DAY AS NEEDED FOR SLEEP   • cyclobenzaprine (FLEXERIL) 10 mg tablet TAKE 1 TABLET EVERY 6-8 HOURS BY ORAL ROUTE AS NEEDED, FOR A MUSCLE RELAXANT.   • metroNIDAZOLE (FLAGYL) 500 mg, Oral, See admin instructions, Taken 2/20 pre op   • neomycin (MYCIFRADIN) 500 mg, Oral, See admin instructions, 2/20 1pm, 4pm, and 9pm   • polyethylene glycol (MiraLax) 17 GM/SCOOP powder Mix with 64 oz Gatorade, begin 4 PM day before surgery per bowel prep instructions.   •  "Synthroid 50 MCG tablet TAKE 1 TABLET BY MOUTH IN THE AM 5 DAYS A WEEK AND 2 TABS 2 DAYS A WEEK. **9 TABS TOTAL A WEEK**     Allergies   Allergen Reactions   • Amoxicillin Rash   • Other Rash and Other (See Comments)     Na   • Penicillins Itching, Rash and Other (See Comments)     No reactions with cephalosporins.    Other reaction(s): yeast infections    Tolerates Keflex    Other reaction(s): yeast infections   Tolerates Keflex   • Propranolol Rash   • Sulfamethoxazole Other (See Comments) and Rash   • Sulfamethoxazole-Trimethoprim Rash   • Trimethoprim Other (See Comments) and Rash         Objective  /74 (BP Location: Right arm, Patient Position: Sitting, Cuff Size: Standard)   Pulse 81   Temp 97.9 °F (36.6 °C) (Temporal)   Resp 16   Ht 5' 5\" (1.651 m)   Wt 75.8 kg (167 lb 3.2 oz)   LMP  (LMP Unknown)   SpO2 98%   BMI 27.82 kg/m²     Body mass index is 27.82 kg/m².  Pain Screening:  Pain Score: 0-No pain  ECOG   1  Physical Exam  Vitals reviewed.   Constitutional:       General: She is not in acute distress.     Appearance: Normal appearance.   HENT:      Head: Normocephalic and atraumatic.      Mouth/Throat:      Mouth: Mucous membranes are moist.   Pulmonary:      Effort: Pulmonary effort is normal.      Breath sounds: Normal breath sounds.   Abdominal:      General: There is no distension.      Palpations: Abdomen is soft. There is no mass.      Tenderness: There is abdominal tenderness. There is no guarding or rebound.      Hernia: No hernia is present.   Skin:     General: Skin is warm and dry.      Comments: Surgical trocar sites are intact, clean and dry without induration, erythema or purulent drainage.    Neurological:      Mental Status: She is alert and oriented to person, place, and time.   Psychiatric:         Mood and Affect: Mood normal.         Behavior: Behavior normal.         Thought Content: Thought content normal.         Judgment: Judgment normal.          Labs: I have reviewed " "pertinent labs.   No results found for: \"\"  Lab Results   Component Value Date/Time    Potassium 4.6 02/13/2025 08:58 AM    Potassium 4.6 10/14/2024 11:07 AM    Chloride 102 02/13/2025 08:58 AM    Chloride 102 10/14/2024 11:07 AM    CO2 31 02/13/2025 08:58 AM    CO2 30 10/14/2024 11:07 AM    BUN 14 02/13/2025 08:58 AM    BUN 20 10/14/2024 11:07 AM    Creatinine 1.39 (H) 02/13/2025 08:58 AM    Glucose, Fasting 96 02/13/2025 08:58 AM    Calcium 9.3 02/13/2025 08:58 AM    Calcium 9.3 10/14/2024 11:07 AM    eGFR 42 02/13/2025 08:58 AM     Lab Results   Component Value Date/Time    WBC 4.55 02/13/2025 08:58 AM    Hemoglobin 13.5 02/13/2025 08:58 AM    Hematocrit 40.0 02/13/2025 08:58 AM    MCV 90 02/13/2025 08:58 AM    Platelets 275 02/13/2025 08:58 AM     No results found for: \"NEUTROABS\"     Trend:  No results found for: \"\"      Other Study Results Review : Pathology reports reviewed.      "

## 2025-03-06 NOTE — ASSESSMENT & PLAN NOTE
Had vomiting after eating half of steak on Thursday.  This has resolved.  She is also constipated with intermittent diarrhea.  Will assess CT abdomen pelvis    Orders:    CBC and differential; Future    Comprehensive metabolic panel; Future    CT abdomen pelvis wo contrast; Future

## 2025-03-06 NOTE — ASSESSMENT & PLAN NOTE
55-year-old status post radical robotic left oophorectomy, lysis of adhesions, vaginotomy, cystoscopy on 2/21/2025 for a serous cystadenoma of the left ovary.  She has postoperative constipation/diarrhea, vomiting.  Her performance status is 1.  1.  CBC, CMP  2.  CT abdomen pelvis without IV contrast to assess for postoperative hematoma/abscess/fluid collection  3.  We discussed ongoing activity limitations  4.  Return in 4 weeks for postoperative pelvic examination

## 2025-03-06 NOTE — PROGRESS NOTES
Name: Elvie Mederos      : 1970      MRN: [id number]  Encounter Provider: Galileo Gonsalves MD  Encounter Date: 3/6/2025   Encounter department: [facility] GYNECOLOGY ONCOLOGY [facility]  :  Assessment & Plan  Other ovarian cyst, left side  55-year-old status post radical robotic left oophorectomy, lysis of adhesions, vaginotomy, cystoscopy on 2025 for a serous cystadenoma of the left ovary.  She has postoperative constipation/diarrhea, vomiting.  Her performance status is 1.  1.  CBC, CMP  2.  CT abdomen pelvis without IV contrast to assess for postoperative hematoma/abscess/fluid collection  3.  We discussed ongoing activity limitations  4.  Return in 4 weeks for postoperative pelvic examination       Bilious vomiting with nausea  Had vomiting after eating half of steak on Thursday.  This has resolved.  She is also constipated with intermittent diarrhea.  Will assess CT abdomen pelvis    Orders:    CBC and differential; Future    Comprehensive metabolic panel; Future    CT abdomen pelvis wo contrast; Future            History of Present Illness   Reason for Visit / CC: Postoperative evaluation, fatigue, constipation, vomiting   Elvie Mederos is a 55 y.o. female   Returns for postoperative evaluation.  She noted fevers to 100.7 and 100.9 immediately after surgery.  These have resolved.  She has also had a poor appetite.  She has been tolerating some liquids.  She had extensive vomiting on Thursday night with nausea.  This has resolved.  She has constipation but occasionally has loose bowel movements.  She also has discomfort at the end of the urinary stream.  She had some vaginal spotting immediately after surgery as well which has resolved.  She is ambulating.  He does not require narcotics.  No other interval change in medications or medical history since her surgery.      Pertinent Medical History          Review of Systems A complete review of systems is  negative other than that noted above in the HPI.  Past Medical History   Past Medical History:   Diagnosis Date    Anxiety     Basal cell carcinoma (BCC) of skin of upper extremity including shoulder     BRCA1 negative     BRCA2 negative     Cancer (HCC) 2014    Basal cell    Colon polyp 2010    Depression 2012    Disease of thyroid gland 2020    Hypo    Gastric polyps     Hashimoto's disease     Hyperthyroidism 2019    Lactose intolerance 1988     Past Surgical History:   Procedure Laterality Date    ABDOMINAL SURGERY          AUGMENTATION BREAST      AUGMENTATION MAMMAPLASTY      BASAL CELL CARCINOMA EXCISION Bilateral     shoulders    CARPAL TUNNEL RELEASE       SECTION      x2; ,     COLONOSCOPY  2020    h/o polyps, 3 in the past  [facility]    COLONOSCOPY  2015    [place]    COLONOSCOPY  2010ish    DEBRIDEMENT TENNIS ELBOW Bilateral     HYSTERECTOMY  2015    Left Salpingectomy    NEPHRECTOMY LIVING DONOR Right 2022    OOPHORECTOMY Right     cystic; Dr Obrien;  with lysis of adhesion    ID LAPS FULG/EXC OVARY VISCERA/PERITONEAL SURFACE Left 2025    Procedure: ROBOTIC ASSISTED RADICAL LEFT OOPHORECTOMY, LYSIS OF ABDOMINAL AND PELVIC ADHESIONS, VAGINOTOMY, CYSTOSCOPY;  Surgeon: Galileo Gonsalves MD;  Location: [facility];  Service: Gynecology Oncology     Family History   Problem Relation Age of Onset    Asthma Mother         Svitlana Kwan    Cancer Mother         Prostate    Heart disease Mother     Hyperlipidemia Mother     Thyroid disease Mother     Dementia Mother     Endometriosis Mother         hysterectomy    Depression Mother     Hypothyroidism Mother     Coronary artery disease Father     Hypertension Father     Hyperlipidemia Father     Prostate cancer Father 65    Cancer Father         LCC    Leukemia Father         CLL    Depression Father     Hearing loss Father     Breast cancer Sister 49    Depression Sister      Hepatitis Sister         C    Heart disease Maternal Grandmother     Heart disease Maternal Grandfather     Heart disease Paternal Grandmother     Diabetes Paternal Grandfather     Heart disease Paternal Grandfather     No Known Problems Daughter     Uterine cancer Neg Hx     Ovarian cancer Neg Hx     Colon cancer Neg Hx       reports that she quit smoking about 9 years ago. Her smoking use included cigarettes. She started smoking about 29 years ago. She has a 10 pack-year smoking history. She has been exposed to tobacco smoke. She has never used smokeless tobacco. She reports that she does not currently use alcohol. She reports that she does not currently use drugs.  Current Outpatient Medications   Medication Instructions    buPROPion (WELLBUTRIN XL) 300 mg, Daily    citalopram (CELEXA) 40 mg, Daily    clonazePAM (KlonoPIN) 0.5 mg tablet TAKE 1 TABLET BY MOUTH EVERY DAY AS NEEDED FOR SLEEP    cyclobenzaprine (FLEXERIL) 10 mg tablet TAKE 1 TABLET EVERY 6-8 HOURS BY ORAL ROUTE AS NEEDED, FOR A MUSCLE RELAXANT.    metroNIDAZOLE (FLAGYL) 500 mg, Oral, See admin instructions, Taken 2/20 pre op    neomycin (MYCIFRADIN) 500 mg, Oral, See admin instructions, 2/20 1pm, 4pm, and 9pm    polyethylene glycol (MiraLax) 17 GM/SCOOP powder Mix with 64 oz Gatorade, begin 4 PM day before surgery per bowel prep instructions.    Synthroid 50 MCG tablet TAKE 1 TABLET BY MOUTH IN THE AM 5 DAYS A WEEK AND 2 TABS 2 DAYS A WEEK. **9 TABS TOTAL A WEEK**     Allergies   Allergen Reactions    Amoxicillin Rash    Other Rash and Other (See Comments)     Na    Penicillins Itching, Rash and Other (See Comments)     No reactions with cephalosporins.    Other reaction(s): yeast infections    Tolerates Keflex    Other reaction(s): yeast infections   Tolerates Keflex    Propranolol Rash    Sulfamethoxazole Other (See Comments) and Rash    Sulfamethoxazole-Trimethoprim Rash    Trimethoprim Other (See Comments) and Rash         Objective   /74  "(BP Location: Right arm, Patient Position: Sitting, Cuff Size: Standard)   Pulse 81   Temp 97.9 °F (36.6 °C) (Temporal)   Resp 16   Ht 5' 5\" (1.651 m)   Wt 75.8 kg (167 lb 3.2 oz)   LMP  (LMP Unknown)   SpO2 98%   BMI 27.82 kg/m²     Body mass index is 27.82 kg/m².  Pain Screening:  Pain Score: 0-No pain  ECOG   1  Physical Exam  Vitals reviewed.   Constitutional:       General: She is not in acute distress.     Appearance: Normal appearance.   HENT:      Head: Normocephalic and atraumatic.      Mouth/Throat:      Mouth: Mucous membranes are moist.   Pulmonary:      Effort: Pulmonary effort is normal.      Breath sounds: Normal breath sounds.   Abdominal:      General: There is no distension.      Palpations: Abdomen is soft. There is no mass.      Tenderness: There is abdominal tenderness. There is no guarding or rebound.      Hernia: No hernia is present.   Skin:     General: Skin is warm and dry.      Comments: Surgical trocar sites are intact, clean and dry without induration, erythema or purulent drainage.    Neurological:      Mental Status: She is alert and oriented to person, place, and time.   Psychiatric:         Mood and Affect: Mood normal.         Behavior: Behavior normal.         Thought Content: Thought content normal.         Judgment: Judgment normal.          Labs: I have reviewed pertinent labs.   No results found for: \"\"  Lab Results   Component Value Date/Time    Potassium 4.6 02/13/2025 08:58 AM    Potassium 4.6 10/14/2024 11:07 AM    Chloride 102 02/13/2025 08:58 AM    Chloride 102 10/14/2024 11:07 AM    CO2 31 02/13/2025 08:58 AM    CO2 30 10/14/2024 11:07 AM    BUN 14 02/13/2025 08:58 AM    BUN 20 10/14/2024 11:07 AM    Creatinine 1.39 (H) 02/13/2025 08:58 AM    Glucose, Fasting 96 02/13/2025 08:58 AM    Calcium 9.3 02/13/2025 08:58 AM    Calcium 9.3 10/14/2024 11:07 AM    eGFR 42 02/13/2025 08:58 AM     Lab Results   Component Value Date/Time    WBC 4.55 02/13/2025 08:58 AM    " "Hemoglobin 13.5 02/13/2025 08:58 AM    Hematocrit 40.0 02/13/2025 08:58 AM    MCV 90 02/13/2025 08:58 AM    Platelets 275 02/13/2025 08:58 AM     No results found for: \"NEUTROABS\"     Trend:  No results found for: \"\"      Other Study Results Review : Pathology reports reviewed.      "

## 2025-03-13 ENCOUNTER — HOSPITAL ENCOUNTER (OUTPATIENT)
Dept: CT IMAGING | Facility: HOSPITAL | Age: 55
Discharge: HOME | End: 2025-03-13
Attending: OBSTETRICS & GYNECOLOGY
Payer: COMMERCIAL

## 2025-03-13 DIAGNOSIS — R11.14 BILIOUS VOMITING WITH NAUSEA: ICD-10-CM

## 2025-03-13 PROCEDURE — 74176 CT ABD & PELVIS W/O CONTRAST: CPT

## 2025-03-18 ENCOUNTER — RESULTS FOLLOW-UP (OUTPATIENT)
Dept: GYNECOLOGIC ONCOLOGY | Facility: CLINIC | Age: 55
End: 2025-03-18

## 2025-03-19 ENCOUNTER — TELEPHONE (OUTPATIENT)
Age: 55
End: 2025-03-19

## 2025-03-19 NOTE — TELEPHONE ENCOUNTER
Rescheduled date of colonoscopy (as of today): 04/21/2025  Physician performing colonoscopy: DR GOMEZ  Location of colonoscopy: [facility]  Bowel prep reviewed with patient: MARYANN/MAG CIT  Instructions reviewed with patient by: Previously reviewed with pt. Verified pt has all procedure directions.   Clearances:     Patient rescheduled as previous date did not work for her.

## 2025-04-07 ENCOUNTER — ANESTHESIA EVENT (OUTPATIENT)
Dept: ANESTHESIOLOGY | Facility: AMBULATORY SURGERY CENTER | Age: 55
End: 2025-04-07

## 2025-04-07 ENCOUNTER — ANESTHESIA (OUTPATIENT)
Dept: ANESTHESIOLOGY | Facility: AMBULATORY SURGERY CENTER | Age: 55
End: 2025-04-07

## 2025-04-15 NOTE — PROGRESS NOTES
Name: Elvie Mederos      : 1970      MRN: [id number]  Encounter Provider: BARBARA Roman  Encounter Date: 2025   [facility]  :  Assessment & Plan  Stage 3b chronic kidney disease (HCC)  Etiology: suspect secondary to decrease renal mass s/p nephrectomy   S/p elevated sCr up to 1.67 two days post op after nephrectomy likely d/t hyperfiltration  Most recent creatinine 1.36 with eGFR 43  Prior Cystan 1.53 with eGFR 43 on 10/14/25  since nephrectomy, b/l sCr 1.2-1.4  Renal U/S reported Normal left kidney and bladder, right surgically absent  avoid NSAIDs such as ibuprofen, aleve, advil, motrin, naproxen, celebrex, indomethacin, toradol  Will have patient return in 6 months with updated labs  Will repeat Cystan C now       Donor of kidney for transplant  in setting of being kidney donor        H/O right nephrectomy  As above       Vitamin D deficiency  On vit D supplements  Previous Vit D was 22 on 6/3/24  Will repeat with next appt           There are no Patient Instructions on file for this visit.    It was a pleasure evaluating your patient in the office today. Thank you for allowing our team to participate in the care of  Elvie Mederos. Please do not hesitate to contact our team if further issues/questions shall arise in the interim.     History of Present Illness {?Quick Links Encounters * My Last Note * Last Note in Specialty * Snapshot * Since Last Visit * History :31871}  HPI  Elvie Mederos is a 55 y.o. female who presents renal follow up.  She was last seen on 24 with Dr. Yost for declining renal function s/p right nephrectomy as donor of kidney for transplant.  Recommendation was to keep well hydrated, Renal U/S reported Normal left kidney and bladder. Cystan C 1.53 with eGFR 42.  Since last seen no ER visits or hospitalizations.  However, she underwent gyn surgical procedure for adhesions.  Most recent lab work reviewed and discussed with  "patient in office today.  On discussion,       {History obtained from(Optional):69662}  Pertinent Medical History   ***  {There is no content from the last Pertinent Medical History section.}    Review of Systems  Medical History Reviewed by provider this encounter:     .  {Select to insert medical history sections (Optional):12367}     Current Outpatient Medications on File Prior to Visit   Medication Sig Dispense Refill    buPROPion (WELLBUTRIN XL) 300 mg 24 hr tablet Take 300 mg by mouth daily      citalopram (CeleXA) 20 mg tablet Take 40 mg by mouth daily      clonazePAM (KlonoPIN) 0.5 mg tablet TAKE 1 TABLET BY MOUTH EVERY DAY AS NEEDED FOR SLEEP      cyclobenzaprine (FLEXERIL) 10 mg tablet TAKE 1 TABLET EVERY 6-8 HOURS BY ORAL ROUTE AS NEEDED, FOR A MUSCLE RELAXANT.      metroNIDAZOLE (FLAGYL) 500 mg tablet Take 500 mg by mouth see administration instructions Taken 2/20 pre op      neomycin (MYCIFRADIN) 500 mg tablet Take 500 mg by mouth see administration instructions 2/20 1pm, 4pm, and 9pm      polyethylene glycol (MiraLax) 17 GM/SCOOP powder Mix with 64 oz Gatorade, begin 4 PM day before surgery per bowel prep instructions. 238 g 0    Synthroid 50 MCG tablet TAKE 1 TABLET BY MOUTH IN THE AM 5 DAYS A WEEK AND 2 TABS 2 DAYS A WEEK. **9 TABS TOTAL A WEEK**       No current facility-administered medications on file prior to visit.     Objective {?Quick Links Trend Vitals * Enter New Vitals * Results Review * Timeline (Adult) * Labs * Imaging * Cardiology * Procedures * Lung Cancer Screening * Surgical eConsent :89665}  LMP  (LMP Unknown)      Physical Exam      Laboratory Results:        Invalid input(s): \"ALBUMIN\"    Results for orders placed or performed in visit on 03/06/25   Comprehensive metabolic panel   Result Value Ref Range    Sodium 142 135 - 147 mmol/L    Potassium 4.3 3.5 - 5.3 mmol/L    Chloride 106 96 - 108 mmol/L    CO2 29 21 - 32 mmol/L    ANION GAP 7 4 - 13 mmol/L    BUN 11 5 - 25 mg/dL    " Creatinine 1.36 (H) 0.60 - 1.30 mg/dL    Glucose 99 65 - 140 mg/dL    Calcium 9.2 8.4 - 10.2 mg/dL    AST 18 13 - 39 U/L    ALT 14 7 - 52 U/L    Alkaline Phosphatase 52 34 - 104 U/L    Total Protein 6.8 6.4 - 8.4 g/dL    Albumin 4.2 3.5 - 5.0 g/dL    Total Bilirubin 0.39 0.20 - 1.00 mg/dL    eGFR 43 ml/min/1.73sq m   CBC and differential   Result Value Ref Range    WBC 5.20 4.31 - 10.16 Thousand/uL    RBC 4.70 3.81 - 5.12 Million/uL    Hemoglobin 13.7 11.5 - 15.4 g/dL    Hematocrit 42.6 34.8 - 46.1 %    MCV 91 82 - 98 fL    MCH 29.1 26.8 - 34.3 pg    MCHC 32.2 31.4 - 37.4 g/dL    RDW 13.1 11.6 - 15.1 %    MPV 9.5 8.9 - 12.7 fL    Platelets 361 149 - 390 Thousands/uL    nRBC 0 /100 WBCs    Segmented % 65 43 - 75 %    Immature Grans % 0 0 - 2 %    Lymphocytes % 22 14 - 44 %    Monocytes % 10 4 - 12 %    Eosinophils Relative 2 0 - 6 %    Basophils Relative 1 0 - 1 %    Absolute Neutrophils 3.36 1.85 - 7.62 Thousands/µL    Absolute Immature Grans 0.02 0.00 - 0.20 Thousand/uL    Absolute Lymphocytes 1.16 0.60 - 4.47 Thousands/µL    Absolute Monocytes 0.53 0.17 - 1.22 Thousand/µL    Eosinophils Absolute 0.10 0.00 - 0.61 Thousand/µL    Basophils Absolute 0.03 0.00 - 0.10 Thousands/µL       {Administrative / Billing Section (Optional):48376}

## 2025-04-17 NOTE — ASSESSMENT & PLAN NOTE
Etiology: suspect secondary to decrease renal mass s/p nephrectomy   S/p elevated sCr up to 1.67 two days post op after nephrectomy likely d/t hyperfiltration  Most recent creatinine 1.36 with eGFR 43  Prior Cystan 1.53 with eGFR 43 on 10/14/25  since nephrectomy, b/l sCr 1.2-1.4  Renal U/S reported Normal left kidney and bladder, right surgically absent  avoid NSAIDs such as ibuprofen, aleve, advil, motrin, naproxen, celebrex, indomethacin, toradol  Will have patient return in 6 months with updated labs  Will repeat Cystan C now

## 2025-04-21 ENCOUNTER — TELEPHONE (OUTPATIENT)
Age: 55
End: 2025-04-21

## 2025-04-21 ENCOUNTER — TELEMEDICINE (OUTPATIENT)
Dept: NEPHROLOGY | Facility: HOSPITAL | Age: 55
End: 2025-04-21
Payer: COMMERCIAL

## 2025-04-21 DIAGNOSIS — Z52.4 DONOR OF KIDNEY FOR TRANSPLANT: ICD-10-CM

## 2025-04-21 DIAGNOSIS — Z90.5 H/O RIGHT NEPHRECTOMY: ICD-10-CM

## 2025-04-21 DIAGNOSIS — N18.32 STAGE 3B CHRONIC KIDNEY DISEASE (HCC): Primary | ICD-10-CM

## 2025-04-21 DIAGNOSIS — E55.9 VITAMIN D DEFICIENCY: ICD-10-CM

## 2025-04-21 PROCEDURE — 99213 OFFICE O/P EST LOW 20 MIN: CPT | Performed by: NURSE PRACTITIONER

## 2025-04-21 NOTE — ASSESSMENT & PLAN NOTE
Etiology: suspect secondary to decrease renal mass s/p nephrectomy   S/p elevated sCr up to 1.67 two days post op after nephrectomy likely d/t hyperfiltration  Most recent creatinine 1.36 with eGFR 43  Prior Cystan 1.53 with eGFR 43 on 10/14/25  since nephrectomy, b/l sCr 1.2-1.4  Renal U/S reported Normal left kidney and bladder, right surgically absent  avoid NSAIDs such as ibuprofen, aleve, advil, motrin, naproxen, celebrex, indomethacin, toradol  Will have patient return in 6 months with updated labs  Orders:    Basic metabolic panel; Future    CBC w/o differential; Future    Albumin / creatinine urine ratio; Future    PTH, intact; Future    Phosphorus; Future    Vitamin D 25 hydroxy; Future    Magnesium; Future    Cystatin C With eGFR; Future

## 2025-04-21 NOTE — PATIENT INSTRUCTIONS
"All questions asked and answered.  The patient has been instructed to call office at [phone] with any questions or concerns.    Please obtain prescribed blood work and urine studies prior to next appointment  Avoid NSAID products which include: Motrin, Advil, Ibuprofen, Aleve, Naprosyn, Naproxen, Mobic or Celebrex   Low sodium diet no more then 2 gram salt daily  Keep hydrated especially when working outside  Get blood work in July and return to office in 5 to 6 months  Patient Education     Chronic kidney disease   The Basics   Written by the doctors and editors at [facility]   What is chronic kidney disease? -- Chronic kidney disease, or \"CKD,\" is when the kidneys stop working as well as they should. When they are working normally, the kidneys filter blood and remove waste and excess salt and water (figure 1).  In people with CKD, the kidneys slowly lose the ability to filter blood. In time, the kidneys can stop working completely. That is why it is so important to keep CKD from getting worse.  What are the symptoms of CKD? -- At first, CKD causes no symptoms. As the disease gets worse, it can:   Make your feet, ankles, or legs swell (called \"edema\")   Give you high blood pressure   Make you very tired   Damage your bones  Will I need tests? -- Yes. Your doctor will want to see you regularly. You will probably have appointments at least once a year, and you will get regular tests to check your kidneys. These include blood and urine tests.  If your CKD gets worse over time, you will probably need to see a \"nephrologist.\" This is a doctor who takes care of people with kidney disease.  Is there anything I can do to keep my kidneys from getting worse? -- Yes. If you have CKD, you can protect your kidneys if you:   Take all of your prescribed medicines every day, and follow all of your doctor's instructions for how to take them.   Keep your blood sugar in a healthy range, if you have diabetes.   Change your diet, if " "your doctor or nurse recommends to. They might suggest working with a dietitian (nutrition expert).   Quit smoking, if you smoke.   Lose weight, if you have excess body weight.   Avoid medicines that can harm the kidneys - One example is nonsteroidal antiinflammatory drugs (\"NSAIDs\"). These medicines include ibuprofen (sample brand names: Advil, Motrin) and naproxen (sample brand name: Aleve). There are other medicines that people with CKD need to avoid, too. Check with your doctor, nurse, or kidney specialist before starting any new medicines or supplements, even those you can buy without a prescription.  How is CKD treated? -- People in the early stages of CKD can take medicines to keep the disease from getting worse. For example, many people with CKD should take medicines known as \"ACE inhibitors\" or \"angiotensin receptor blockers.\" If your doctor or nurse prescribes these medicines, it is very important that you take them every day as directed. If they cause side effects or cost too much, tell your doctor or nurse. They might have solutions to offer.  What happens if my kidneys stop working completely? -- If your kidneys can no longer filter blood properly, you can choose between 3 different treatments to take over their job. Your choices are:   Kidney transplant - After transplant surgery, the new kidney can do the job of your own kidneys. If you have a kidney transplant, you will need to take medicines for the rest of your life to keep your body from reacting badly to the new kidney. (You only need 1 kidney to live.)   Hemodialysis - This is a procedure in which a dialysis machine takes over the job of the kidneys. The machine pumps blood out of the body, filters it, and returns it to the body. If you choose hemodialysis, you will need to be hooked up to the machine at least 3 times a week for several hours for the rest of your life. Before you start, you will also need to have surgery to prepare a blood " vessel for attachment to the machine.   Peritoneal dialysis - This involves piping a special fluid into the belly every day. If you choose peritoneal dialysis, you will need surgery to have a tube implanted in your belly. Then, you will have to learn how to pipe the fluid in and out through that tube.  How do I choose between the different treatment options? -- You and your doctor will need to work together to find a treatment that's right for you. Kidney transplant surgery is usually the best option for most people. But often, there are no kidneys available for transplant.  Ask your doctor to explain all of your options and how they might work for you. Then, talk openly with them about how you feel about all of the options. You might even decide that you do not want any treatment. That is your choice.  All topics are updated as new evidence becomes available and our peer review process is complete.  This topic retrieved from [employer] on: May 18, 2024.  Topic 83909 Version 34.0  Release: 32.4.3 - C32.137  © 2024 UpToDate, Inc. and/or its affiliates. All rights reserved.  figure 1: Anatomy of the urinary tract     Urine is made by the kidneys. It passes from the kidneys into the bladder through 2 tubes called the ureters. Then, it leaves the bladder through another tube called the urethra.  Graphic 84499 Version 8.0  Consumer Information Use and Disclaimer   Disclaimer: This generalized information is a limited summary of diagnosis, treatment, and/or medication information. It is not meant to be comprehensive and should be used as a tool to help the user understand and/or assess potential diagnostic and treatment options. It does NOT include all information about conditions, treatments, medications, side effects, or risks that may apply to a specific patient. It is not intended to be medical advice or a substitute for the medical advice, diagnosis, or treatment of a health care provider based on the health care  provider's examination and assessment of a patient's specific and unique circumstances. Patients must speak with a health care provider for complete information about their health, medical questions, and treatment options, including any risks or benefits regarding use of medications. This information does not endorse any treatments or medications as safe, effective, or approved for treating a specific patient. UpToDate, Inc. and its affiliates disclaim any warranty or liability relating to this information or the use thereof.The use of this information is governed by the Terms of Use, available at https://www.[employer].com/en/know/clinical-effectiveness-terms. 2024© UpToDate, Inc. and its affiliates and/or licensors. All rights reserved.  Copyright   © 2024 UpToDate, Inc. and/or its affiliates. All rights reserved.

## 2025-04-21 NOTE — TELEPHONE ENCOUNTER
Called and spoke with Elvie and was able to change today's appointment to virtual via [employer].

## 2025-04-21 NOTE — TELEPHONE ENCOUNTER
Pt called she has an appt today at 3:30pm and can't leave work . Pt asking if it can be a virtual.     Please advise.

## 2025-04-21 NOTE — ASSESSMENT & PLAN NOTE
On vit D supplements  Previous Vit D was 22 on 6/3/24  Will repeat with next leonila  Orders:    PTH, intact; Future    Vitamin D 25 hydroxy; Future

## 2025-04-21 NOTE — PROGRESS NOTES
Virtual Regular VisitName: Elvie Mederos      : 1970      MRN: [id number]  Encounter Provider: BARBARA Roman  Encounter Date: 2025   [facility]  :  Assessment & Plan  Stage 3b chronic kidney disease (HCC)  Etiology: suspect secondary to decrease renal mass s/p nephrectomy   S/p elevated sCr up to 1.67 two days post op after nephrectomy likely d/t hyperfiltration  Most recent creatinine 1.36 with eGFR 43  Prior Cystan 1.53 with eGFR 43 on 10/14/25  since nephrectomy, b/l sCr 1.2-1.4  Renal U/S reported Normal left kidney and bladder, right surgically absent  avoid NSAIDs such as ibuprofen, aleve, advil, motrin, naproxen, celebrex, indomethacin, toradol  Will have patient return in 6 months with updated labs  Orders:    Basic metabolic panel; Future    CBC w/o differential; Future    Albumin / creatinine urine ratio; Future    PTH, intact; Future    Phosphorus; Future    Vitamin D 25 hydroxy; Future    Magnesium; Future    Cystatin C With eGFR; Future    Donor of kidney for transplant  in setting of being kidney donor        H/O right nephrectomy  As above  Orders:    Cystatin C With eGFR; Future    Vitamin D deficiency  On vit D supplements  Previous Vit D was 22 on 6/3/24  Will repeat with next leonila  Orders:    PTH, intact; Future    Vitamin D 25 hydroxy; Future           History of Present Illness     55-year-old female presents via telemedicine due to time constraints for CKD follow-up.  Since last seen there has been no ER visits.  She did undergo GYN 1 day procedure.  Most recent blood work has been reviewed and discussed with patient today via telemedicine.  Patient reports feeling well.  Feels dehydrated when working outside.     HPI  Review of Systems   Constitutional: Negative.  Negative for activity change, appetite change, chills, diaphoresis, fatigue and fever.   HENT: Negative.  Negative for congestion and facial swelling.    Respiratory:  Negative.     Cardiovascular: Negative.    Gastrointestinal: Negative.    Endocrine: Negative.    Genitourinary: Negative.    Musculoskeletal: Negative.    Skin: Negative.    Allergic/Immunologic: Negative.    Neurological: Negative.    Hematological: Negative.    Psychiatric/Behavioral: Negative.         Objective   LMP  (LMP Unknown)     Physical Exam  Constitutional:       Appearance: Normal appearance.   HENT:      Head: Normocephalic and atraumatic.   Eyes:      Conjunctiva/sclera: Conjunctivae normal.   Pulmonary:      Effort: Pulmonary effort is normal.   Musculoskeletal:         General: Normal range of motion.      Cervical back: Normal range of motion.   Neurological:      General: No focal deficit present.      Mental Status: She is alert and oriented to person, place, and time.   Psychiatric:         Mood and Affect: Mood normal.         Behavior: Behavior normal.         Administrative Statements   Encounter provider BARBARA Roman    The Patient is located at Home and in the following state in which I hold an active license [place].    The patient was identified by name and date of birth. Elvie Mederos was informed that this is a telemedicine visit and that the visit is being conducted through the Epic Embedded platform. She agrees to proceed..  My office door was closed. No one else was in the room.  She acknowledged consent and understanding of privacy and security of the video platform. The patient has agreed to participate and understands they can discontinue the visit at any time.    I have spent a total time of 15   minutes in caring for this patient on the day of the visit/encounter including Diagnostic results, Instructions for management, Risk factor reductions, Documenting in the medical record, Reviewing/placing orders in the medical record (including tests, medications, and/or procedures), and Obtaining or reviewing history  , not including the time spent for establishing the audio/video  connection.

## 2025-05-01 ENCOUNTER — OFFICE VISIT (OUTPATIENT)
Age: 55
End: 2025-05-01

## 2025-05-01 VITALS
SYSTOLIC BLOOD PRESSURE: 120 MMHG | TEMPERATURE: 98 F | HEIGHT: 65 IN | RESPIRATION RATE: 18 BRPM | BODY MASS INDEX: 28.66 KG/M2 | DIASTOLIC BLOOD PRESSURE: 78 MMHG | WEIGHT: 172 LBS | HEART RATE: 78 BPM | OXYGEN SATURATION: 100 %

## 2025-05-01 DIAGNOSIS — M25.512 CHRONIC LEFT SHOULDER PAIN: ICD-10-CM

## 2025-05-01 DIAGNOSIS — G89.29 CHRONIC LEFT SHOULDER PAIN: ICD-10-CM

## 2025-05-01 DIAGNOSIS — N83.292 OTHER OVARIAN CYST, LEFT SIDE: Primary | ICD-10-CM

## 2025-05-01 PROCEDURE — 99024 POSTOP FOLLOW-UP VISIT: CPT | Performed by: OBSTETRICS & GYNECOLOGY

## 2025-05-01 NOTE — LETTER
May 1, 2025     Kelley Melton MD  [address]  [address]  [place] [address]    Patient: Elvie Mederos   YOB: 1970   Date of Visit: 2025       Dear Dr. Kelley Melton MD:    Thank you for referring Elvie Mederos to me for evaluation. Below are my notes for this consultation.    If you have questions, please do not hesitate to call me. I look forward to following your patient along with you.         Sincerely,        Galileo Gonsalves MD        CC: No Recipients    Galileo Gonsalves MD  2025  9:24 AM  Sign when Signing Visit  Name: Elvie Mederos      : 1970      MRN: [id number]  Encounter Provider: Galileo Gonsalves MD  Encounter Date: 2025   Encounter department: [facility] GYNECOLOGY ONCOLOGY [facility]  :  Assessment & Plan  Other ovarian cyst, left side  55-year-old status post radical robotic left oophorectomy, lysis of adhesions, vaginotomy, cystoscopy 2025.  Final pathology was benign.  She had postoperative abdominal pain and was evaluated with CT imaging and labs which were normal.  She is recovering well from surgery.  Performance status is 0.  1.  She was instructed to call the office with any additional questions or concerns.       Chronic left shoulder pain  Would like a second opinion regarding management of chronic left shoulder pain.  She was previously seen at the [facility].  Orders:  •  Ambulatory referral to Spine & Pain Management; Future      Assessment & Plan            History of Present Illness  Reason for Visit / CC: Postoperative evaluation   Elvie Mederos is a 55 y.o. female   History of Present Illness  Who returns for postoperative evaluation.  She had postoperative constipation/diarrhea, vomiting.  CBC and CMP were normal on 3/6/2025.  CT abdomen pelvis 3/13/2025 did not reveal any evidence of postoperative infection, enteritis.  No hydronephrosis.  She is ambulating, voiding, having bowel  "movements.  Pain is controlled.  No vaginal bleeding.  She has been dealing with a lot of issues at home.  She has chronic left shoulder pain and has been followed by [facility].  She is currently getting intermittent injections and would like a second opinion regarding management.  She has previously been deemed not a surgical candidate.  Pertinent Medical History           Review of Systems A complete review of systems is negative other than that noted above in the HPI.  Current Outpatient Medications on File Prior to Visit   Medication Sig Dispense Refill   • buPROPion (WELLBUTRIN XL) 300 mg 24 hr tablet Take 300 mg by mouth daily     • citalopram (CeleXA) 20 mg tablet Take 40 mg by mouth daily     • clonazePAM (KlonoPIN) 0.5 mg tablet TAKE 1 TABLET BY MOUTH EVERY DAY AS NEEDED FOR SLEEP     • cyclobenzaprine (FLEXERIL) 10 mg tablet TAKE 1 TABLET EVERY 6-8 HOURS BY ORAL ROUTE AS NEEDED, FOR A MUSCLE RELAXANT.     • metroNIDAZOLE (FLAGYL) 500 mg tablet Take 500 mg by mouth see administration instructions Taken 2/20 pre op     • neomycin (MYCIFRADIN) 500 mg tablet Take 500 mg by mouth see administration instructions 2/20 1pm, 4pm, and 9pm     • polyethylene glycol (MiraLax) 17 GM/SCOOP powder Mix with 64 oz Gatorade, begin 4 PM day before surgery per bowel prep instructions. 238 g 0   • Synthroid 50 MCG tablet TAKE 1 TABLET BY MOUTH IN THE AM 5 DAYS A WEEK AND 2 TABS 2 DAYS A WEEK. **9 TABS TOTAL A WEEK**       No current facility-administered medications on file prior to visit.         Objective  /78 (BP Location: Left arm, Patient Position: Sitting, Cuff Size: Adult)   Pulse 78   Temp 98 °F (36.7 °C)   Resp 18   Ht 5' 5\" (1.651 m)   Wt 78 kg (172 lb)   LMP  (LMP Unknown)   SpO2 100%   BMI 28.62 kg/m²     Body mass index is 28.62 kg/m².  Pain Screening:  Pain Score: 0-No pain  ECOG   0  Physical Exam  Vitals reviewed. Exam conducted with a chaperone present.   Constitutional:       General: " "She is not in acute distress.     Appearance: Normal appearance.   HENT:      Head: Normocephalic and atraumatic.      Mouth/Throat:      Mouth: Mucous membranes are moist.   Abdominal:      Palpations: Abdomen is soft. There is no mass.      Tenderness: There is no abdominal tenderness.   Genitourinary:     Comments: The external female genitalia is normal. The bartholin's, uretheral and skenes glands are normal. The urethral meatus is normal (midline with no lesions). Anus without fissure or lesion. Speculum exam reveals a grossly normal vagina. Vagina is intact, without dehiscense. No masses, lesions, discharge or bleeding. No significant cystocele or rectocele noted. Bimanual exam notes a surgical absent cervix, uterus and adnexal structures. No masses or fullness. Bladder is without fullness, mass or tenderness.  Skin:     General: Skin is warm and dry.      Comments: Surgical trocar sites are well healed.    Neurological:      Mental Status: She is alert and oriented to person, place, and time.   Psychiatric:         Mood and Affect: Mood normal.         Behavior: Behavior normal.         Thought Content: Thought content normal.         Judgment: Judgment normal.       Physical Exam       Results    Labs: I have reviewed pertinent labs.   No results found for: \"\"  Lab Results   Component Value Date/Time    Potassium 4.3 03/06/2025 10:58 AM    Potassium 4.6 10/14/2024 11:07 AM    Chloride 106 03/06/2025 10:58 AM    Chloride 102 10/14/2024 11:07 AM    CO2 29 03/06/2025 10:58 AM    CO2 30 10/14/2024 11:07 AM    BUN 11 03/06/2025 10:58 AM    BUN 20 10/14/2024 11:07 AM    Creatinine 1.36 (H) 03/06/2025 10:58 AM    Glucose, Fasting 96 02/13/2025 08:58 AM    Calcium 9.2 03/06/2025 10:58 AM    Calcium 9.3 10/14/2024 11:07 AM    AST 18 03/06/2025 10:58 AM    ALT 14 03/06/2025 10:58 AM    Alkaline Phosphatase 52 03/06/2025 10:58 AM    eGFR 43 03/06/2025 10:58 AM     Lab Results   Component Value Date/Time    WBC " "5.20 03/06/2025 10:58 AM    Hemoglobin 13.7 03/06/2025 10:58 AM    Hematocrit 42.6 03/06/2025 10:58 AM    MCV 91 03/06/2025 10:58 AM    Platelets 361 03/06/2025 10:58 AM     Lab Results   Component Value Date/Time    Absolute Neutrophils 3.36 03/06/2025 10:58 AM        Trend:  No results found for: \"\"    Radiology Results Review: I personally reviewed the following image studies in PACS and associated radiology reports: CT abdomen/pelvis. My interpretation of the radiology images/reports is: No evidence of hydronephrosis, pelvic abscess, enteritis.        "

## 2025-05-01 NOTE — PROGRESS NOTES
Name: Elvie Mederos      : 1970      MRN: [id number]  Encounter Provider: Galileo Gonsalves MD  Encounter Date: 2025   Encounter department: [facility] GYNECOLOGY ONCOLOGY [facility]  :  Assessment & Plan  Other ovarian cyst, left side  55-year-old status post radical robotic left oophorectomy, lysis of adhesions, vaginotomy, cystoscopy 2025.  Final pathology was benign.  She had postoperative abdominal pain and was evaluated with CT imaging and labs which were normal.  She is recovering well from surgery.  Performance status is 0.  1.  She was instructed to call the office with any additional questions or concerns.       Chronic left shoulder pain  Would like a second opinion regarding management of chronic left shoulder pain.  She was previously seen at the [facility].  Orders:    Ambulatory referral to Spine & Pain Management; Future      Assessment & Plan            History of Present Illness   Reason for Visit / CC: Postoperative evaluation   Elvie Mederos is a 55 y.o. female   History of Present Illness  Who returns for postoperative evaluation.  She had postoperative constipation/diarrhea, vomiting.  CBC and CMP were normal on 3/6/2025.  CT abdomen pelvis 3/13/2025 did not reveal any evidence of postoperative infection, enteritis.  No hydronephrosis.  She is ambulating, voiding, having bowel movements.  Pain is controlled.  No vaginal bleeding.  She has been dealing with a lot of issues at home.  She has chronic left shoulder pain and has been followed by [facility].  She is currently getting intermittent injections and would like a second opinion regarding management.  She has previously been deemed not a surgical candidate.  Pertinent Medical History            Review of Systems A complete review of systems is negative other than that noted above in the HPI.  Current Outpatient Medications on File Prior to Visit   Medication Sig Dispense  "Refill    buPROPion (WELLBUTRIN XL) 300 mg 24 hr tablet Take 300 mg by mouth daily      citalopram (CeleXA) 20 mg tablet Take 40 mg by mouth daily      clonazePAM (KlonoPIN) 0.5 mg tablet TAKE 1 TABLET BY MOUTH EVERY DAY AS NEEDED FOR SLEEP      cyclobenzaprine (FLEXERIL) 10 mg tablet TAKE 1 TABLET EVERY 6-8 HOURS BY ORAL ROUTE AS NEEDED, FOR A MUSCLE RELAXANT.      metroNIDAZOLE (FLAGYL) 500 mg tablet Take 500 mg by mouth see administration instructions Taken 2/20 pre op      neomycin (MYCIFRADIN) 500 mg tablet Take 500 mg by mouth see administration instructions 2/20 1pm, 4pm, and 9pm      polyethylene glycol (MiraLax) 17 GM/SCOOP powder Mix with 64 oz Gatorade, begin 4 PM day before surgery per bowel prep instructions. 238 g 0    Synthroid 50 MCG tablet TAKE 1 TABLET BY MOUTH IN THE AM 5 DAYS A WEEK AND 2 TABS 2 DAYS A WEEK. **9 TABS TOTAL A WEEK**       No current facility-administered medications on file prior to visit.         Objective   /78 (BP Location: Left arm, Patient Position: Sitting, Cuff Size: Adult)   Pulse 78   Temp 98 °F (36.7 °C)   Resp 18   Ht 5' 5\" (1.651 m)   Wt 78 kg (172 lb)   LMP  (LMP Unknown)   SpO2 100%   BMI 28.62 kg/m²     Body mass index is 28.62 kg/m².  Pain Screening:  Pain Score: 0-No pain  ECOG   0  Physical Exam  Vitals reviewed. Exam conducted with a chaperone present.   Constitutional:       General: She is not in acute distress.     Appearance: Normal appearance.   HENT:      Head: Normocephalic and atraumatic.      Mouth/Throat:      Mouth: Mucous membranes are moist.   Abdominal:      Palpations: Abdomen is soft. There is no mass.      Tenderness: There is no abdominal tenderness.   Genitourinary:     Comments: The external female genitalia is normal. The bartholin's, uretheral and skenes glands are normal. The urethral meatus is normal (midline with no lesions). Anus without fissure or lesion. Speculum exam reveals a grossly normal vagina. Vagina is intact, " "without dehiscense. No masses, lesions, discharge or bleeding. No significant cystocele or rectocele noted. Bimanual exam notes a surgical absent cervix, uterus and adnexal structures. No masses or fullness. Bladder is without fullness, mass or tenderness.  Skin:     General: Skin is warm and dry.      Comments: Surgical trocar sites are well healed.    Neurological:      Mental Status: She is alert and oriented to person, place, and time.   Psychiatric:         Mood and Affect: Mood normal.         Behavior: Behavior normal.         Thought Content: Thought content normal.         Judgment: Judgment normal.       Physical Exam       Results    Labs: I have reviewed pertinent labs.   No results found for: \"\"  Lab Results   Component Value Date/Time    Potassium 4.3 03/06/2025 10:58 AM    Potassium 4.6 10/14/2024 11:07 AM    Chloride 106 03/06/2025 10:58 AM    Chloride 102 10/14/2024 11:07 AM    CO2 29 03/06/2025 10:58 AM    CO2 30 10/14/2024 11:07 AM    BUN 11 03/06/2025 10:58 AM    BUN 20 10/14/2024 11:07 AM    Creatinine 1.36 (H) 03/06/2025 10:58 AM    Glucose, Fasting 96 02/13/2025 08:58 AM    Calcium 9.2 03/06/2025 10:58 AM    Calcium 9.3 10/14/2024 11:07 AM    AST 18 03/06/2025 10:58 AM    ALT 14 03/06/2025 10:58 AM    Alkaline Phosphatase 52 03/06/2025 10:58 AM    eGFR 43 03/06/2025 10:58 AM     Lab Results   Component Value Date/Time    WBC 5.20 03/06/2025 10:58 AM    Hemoglobin 13.7 03/06/2025 10:58 AM    Hematocrit 42.6 03/06/2025 10:58 AM    MCV 91 03/06/2025 10:58 AM    Platelets 361 03/06/2025 10:58 AM     Lab Results   Component Value Date/Time    Absolute Neutrophils 3.36 03/06/2025 10:58 AM        Trend:  No results found for: \"\"    Radiology Results Review: I personally reviewed the following image studies in PACS and associated radiology reports: CT abdomen/pelvis. My interpretation of the radiology images/reports is: No evidence of hydronephrosis, pelvic abscess, enteritis.        "

## 2025-05-01 NOTE — ASSESSMENT & PLAN NOTE
Would like a second opinion regarding management of chronic left shoulder pain.  She was previously seen at the [facility].  Orders:    Ambulatory referral to Spine & Pain Management; Future

## 2025-05-01 NOTE — ASSESSMENT & PLAN NOTE
55-year-old status post radical robotic left oophorectomy, lysis of adhesions, vaginotomy, cystoscopy 2/21/2025.  Final pathology was benign.  She had postoperative abdominal pain and was evaluated with CT imaging and labs which were normal.  She is recovering well from surgery.  Performance status is 0.  1.  She was instructed to call the office with any additional questions or concerns.

## 2025-05-02 ENCOUNTER — ANESTHESIA EVENT (OUTPATIENT)
Dept: ANESTHESIOLOGY | Facility: AMBULATORY SURGERY CENTER | Age: 55
End: 2025-05-02

## 2025-05-02 ENCOUNTER — ANESTHESIA (OUTPATIENT)
Dept: ANESTHESIOLOGY | Facility: AMBULATORY SURGERY CENTER | Age: 55
End: 2025-05-02

## 2025-05-23 ENCOUNTER — TELEPHONE (OUTPATIENT)
Dept: OBGYN CLINIC | Facility: CLINIC | Age: 55
End: 2025-05-23

## 2025-05-23 NOTE — TELEPHONE ENCOUNTER
Patient came into office asking that records be obtained from [facility]. Medical Information Release faxed to [phone] ([place]). Confirmation received.

## 2025-06-03 DIAGNOSIS — M25.512 LEFT SHOULDER PAIN, UNSPECIFIED CHRONICITY: Primary | ICD-10-CM

## 2025-06-26 ENCOUNTER — OFFICE VISIT (OUTPATIENT)
Dept: OBGYN CLINIC | Facility: CLINIC | Age: 55
End: 2025-06-26
Payer: COMMERCIAL

## 2025-06-26 VITALS — BODY MASS INDEX: 28.66 KG/M2 | HEIGHT: 65 IN | WEIGHT: 172 LBS

## 2025-06-26 DIAGNOSIS — M75.02 ADHESIVE CAPSULITIS OF LEFT SHOULDER: ICD-10-CM

## 2025-06-26 DIAGNOSIS — M75.82 ROTATOR CUFF TENDINITIS, LEFT: Primary | ICD-10-CM

## 2025-06-26 PROCEDURE — 99203 OFFICE O/P NEW LOW 30 MIN: CPT | Performed by: STUDENT IN AN ORGANIZED HEALTH CARE EDUCATION/TRAINING PROGRAM

## 2025-06-26 NOTE — PROGRESS NOTES
[facility]TH[facility] [place]  [address]  [facility] [address]  [facility] [address]  [phone]       Elvie Mederos  [id number]  1970    ORTHOPAEDIC SURGERY OUTPATIENT NOTE  6/26/2025  :  Assessment & Plan  Rotator cuff tendinitis, left  Discussed to early to repeat CSI, however she can return in 1 month for repeat CSI  Activity as tolerated  Home exercise program reviewed  Anti-inflammatories or Tylenol prn pain  Ice and heat as needed  Return in about 4 weeks (around 7/24/2025) for CSI.         Adhesive capsulitis of left shoulder  Previous history of adhesive capsulitis of left shoulder.  Resolved.           The patient's diagnosis and treatment were discussed at length today. We discussed no treatment, non-operative treatment, and operative treatment.    Procedures  No procedures today.    Translation: N/A    HISTORY:  55 y.o. 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 Referred to me by themself, seen in clinic for evaluation of left shoulder pain.  She states that she has been having ongoing left shoulder pain for several months without any specific injury or trauma.  She was previously being seen by Dr. Claudio at [facility] for adhesive capsulitis.  She mentions that she did do home exercises as well as 2 cortisone injections: The first in February/January 2025 with a subsequent injection on April 23, 2025.  She states that the injections typically provide her 3 to 4 months relief of her symptoms, however the most recent injection was done in a new position which caused increased pain and discomfort.  She states that her frozen shoulder has resolved, however she does have posterior and lateral shoulder pain that is worse with repetitive use.  She has attempted home exercises.  She does have a history of thyroid issues.  She denies any previous history of injury or trauma to her shoulder.  She denies any numbness or tingling.    Surgical  "History:  None    Previous Injection(s): L Subacromial in 2025 and 2025      The following portions of the patient's history were reviewed and updated as appropriate: allergies, current medications, past family history, past social history, past surgical history and problem list.    Ht 5' 5\" (1.651 m)   Wt 78 kg (172 lb)   LMP  (LMP Unknown)   BMI 28.62 kg/m²    Lab Results   Component Value Date    HGBA1C 5.5 2024         Past Medical History[1]    Past Surgical History[2]    Social History     Socioeconomic History    Marital status: Single     Spouse name: Not on file    Number of children: Not on file    Years of education: Not on file    Highest education level: Not on file   Occupational History    Not on file   Tobacco Use    Smoking status: Former     Current packs/day: 0.00     Average packs/day: 0.5 packs/day for 20.0 years (10.0 ttl pk-yrs)     Types: Cigarettes     Start date: 1996     Quit date: 2016     Years since quittin.4     Passive exposure: Past    Smokeless tobacco: Never   Vaping Use    Vaping status: Never Used   Substance and Sexual Activity    Alcohol use: Not Currently     Comment: ocassionally    Drug use: Not Currently    Sexual activity: Not Currently     Partners: Male     Birth control/protection: Other     Comment: Don’t care   Other Topics Concern    Not on file   Social History Narrative    Not on file     Social Drivers of Health     Financial Resource Strain: Not on file   Food Insecurity: Not on file   Transportation Needs: Not on file   Physical Activity: Not on file   Stress: Not on file   Social Connections: Not on file   Intimate Partner Violence: Not on file   Housing Stability: Not on file       Family History[3]     Patient's Medications   New Prescriptions    No medications on file   Previous Medications    BUPROPION (WELLBUTRIN XL) 300 MG 24 HR TABLET    Take 300 mg by mouth in the morning.    CITALOPRAM (CELEXA) 20 MG " TABLET    Take 40 mg by mouth in the morning.    CLONAZEPAM (KLONOPIN) 0.5 MG TABLET        CYCLOBENZAPRINE (FLEXERIL) 10 MG TABLET        METRONIDAZOLE (FLAGYL) 500 MG TABLET    Take 500 mg by mouth see administration instructions Taken 2/20 pre op    NEOMYCIN (MYCIFRADIN) 500 MG TABLET    Take 500 mg by mouth see administration instructions 2/20 1pm, 4pm, and 9pm    POLYETHYLENE GLYCOL (MIRALAX) 17 GM/SCOOP POWDER    Mix with 64 oz Gatorade, begin 4 PM day before surgery per bowel prep instructions.    SYNTHROID 50 MCG TABLET       Modified Medications    No medications on file   Discontinued Medications    No medications on file       Allergies[4]       REVIEW OF SYSTEMS:  Constitutional: Negative.    HEENT: Negative.    Respiratory: Negative.    Skin: Negative.    Neurological: Negative.    Psychiatric/Behavioral: Negative.  Musculoskeletal: Negative except for that mentioned in the HPI.    Gen: No acute distress, resting comfortably in bed  HEENT: Eyes clear, moist mucus membranes, hearing intact  Respiratory: No audible wheezing or stridor  Cardiovascular: Well Perfused peripherally, 2+ distal pulse  Abdomen: nondistended, no peritoneal signs     PHYSICAL EXAM:      Left Shoulder Exam  Alignment / Posture:  Normal shoulder posture. Normal scapular position. No scapular dyskinesis or winging.  Inspection:  No swelling. No edema. No erythema. No ecchymosis. No muscle atrophy. No deformity.  Palpation:  Subacromial and posterior tenderness. No effusion. No warmth. No crepitus. No clicking, catching, or snapping.  ROM:  Forward elevation to 180, Abduction to 100, External rotation with the arm at the side to 60, Internal rotation to T8  Strength:  Forward elevation 4+/5. Abduction to 4+/5. External Rotation to 4+/5. Internal Rotation 4+/5.  Stability:  No objective shoulder instability.  Tests: (+) Rodrigez. (+) Neer. (-) Belly press.  Neurovascular:  Sensation intact in Ax/R/M/U nerve distributions. 2+ radial  "pulse.    IMAGING:  I have personally reviewed pertinent films in PACS and/or [facility].  MRI of left shoulder - Performed on 2024 demonstrates moderate supraspinatus tendinosis with no evidence of full thickness tear. Moderate AC joint osteoarthritis and subacromial bursal. I have reviewed the radiology report(s) and agree with their impression.      Electronic Medical Records were reviewed including Imaging    Dario Wagner    Scribe Attestation      I,:  Dario Wagner am acting as a scribe while in the presence of the attending physician.:       I,:  Bradly Hernandez DO personally performed the services described in this documentation    as scribed in my presence.:               Portions of the record may have been created with voice recognition software.  Occasional wrong word or \"sound a like\" substitutions may have occurred due to the inherent limitations of voice recognition software.  Read the chart carefully and recognize, using context, where substitutions have occurred. All patient's questions were answered to their satisfaction.          [1]   Past Medical History:  Diagnosis Date    Anxiety     Basal cell carcinoma (BCC) of skin of upper extremity including shoulder     BRCA1 negative     BRCA2 negative     Cancer (HCC) 2014    Basal cell    Colon polyp 2010    Depression 2012    Disease of thyroid gland 2020    Hypo    Gastric polyps     Hashimoto's disease     Hyperthyroidism 2019    Lactose intolerance 1988   [2]   Past Surgical History:  Procedure Laterality Date    ABDOMINAL SURGERY          AUGMENTATION BREAST      AUGMENTATION MAMMAPLASTY      BASAL CELL CARCINOMA EXCISION Bilateral     shoulders    CARPAL TUNNEL RELEASE       SECTION      x2; ,     COLONOSCOPY  2020    h/o polyps, 3 in the past  [facility]    COLONOSCOPY  2015    [facility]    COLONOSCOPY  2010ish    DEBRIDEMENT TENNIS ELBOW Bilateral     ELBOW SURGERY Right 2021 "    Elbow repair    HYSTERECTOMY  2015    Left Salpingectomy    NEPHRECTOMY LIVING DONOR Right 02/18/2022    OOPHORECTOMY Right 2014    cystic; Dr Obrien;  with lysis of adhesion    NM LAPS FULG/EXC OVARY VISCERA/PERITONEAL SURFACE Left 02/21/2025    Procedure: ROBOTIC ASSISTED RADICAL LEFT OOPHORECTOMY, LYSIS OF ABDOMINAL AND PELVIC ADHESIONS, VAGINOTOMY, CYSTOSCOPY;  Surgeon: Galileo Gonsalves MD;  Location: [facility];  Service: Gynecology Oncology   [3]   Family History  Problem Relation Name Age of Onset    Asthma Mother Salima DeLellis         Svitlana DeLellis    Cancer Mother Salima DeLellis         Prostate    Heart disease Mother Salima DeLellis     Hyperlipidemia Mother Salima DeLellis     Thyroid disease Mother Salima DeLellis     Dementia Mother Salima DeLellis     Endometriosis Mother Salima DeLellis         hysterectomy    Depression Mother Salima DeLellis     Hypothyroidism Mother Salima DeLellis     Coronary artery disease Father Forest DeLellis     Hypertension Father Forest DeLellis     Hyperlipidemia Father Forest Hunterllis     Prostate cancer Father Forest DeLellis 65    Cancer Father Forest Hunterllis         LCC    Leukemia Father Forest Hunterllis         CLL    Depression Father Forest Hunterllis     Hearing loss Father Forest Hunterllis     Breast cancer Sister Katarina 49    Depression Sister Svitlana Hunterllis     Hepatitis Sister Svitlana Boltonis         C    Heart disease Maternal Grandmother  Damillio     Heart disease Maternal Grandfather Kendrick Makio     Heart disease Paternal Grandmother Ania Kwan     Diabetes Paternal Grandfather Forest Hunterllis     Heart disease Paternal Grandfather Forets Hunterllis     No Known Problems Daughter girl trans boy     Uterine cancer Neg Hx      Ovarian cancer Neg Hx      Colon cancer Neg Hx     [4]   Allergies  Allergen Reactions    Amoxicillin Rash    Other Rash and Other (See Comments)     Na    Penicillins Itching, Rash and Other (See Comments)      No reactions with cephalosporins.    Other reaction(s): yeast infections    Tolerates Keflex    Other reaction(s): yeast infections   Tolerates Keflex    Propranolol Rash    Sulfamethoxazole Other (See Comments) and Rash    Sulfamethoxazole-Trimethoprim Rash    Trimethoprim Other (See Comments) and Rash

## 2025-08-05 ENCOUNTER — TELEPHONE (OUTPATIENT)
Age: 55
End: 2025-08-05

## 2025-08-05 DIAGNOSIS — Z90.5 H/O RIGHT NEPHRECTOMY: ICD-10-CM

## 2025-08-05 DIAGNOSIS — N18.32 STAGE 3B CHRONIC KIDNEY DISEASE (HCC): Primary | ICD-10-CM

## 2025-08-05 DIAGNOSIS — Z52.4 DONOR OF KIDNEY FOR TRANSPLANT: ICD-10-CM

## 2025-08-05 DIAGNOSIS — E55.9 VITAMIN D DEFICIENCY: ICD-10-CM

## 2025-08-07 ENCOUNTER — OFFICE VISIT (OUTPATIENT)
Dept: NEPHROLOGY | Facility: HOSPITAL | Age: 55
End: 2025-08-07
Payer: COMMERCIAL

## 2025-08-07 VITALS
HEIGHT: 65 IN | SYSTOLIC BLOOD PRESSURE: 116 MMHG | DIASTOLIC BLOOD PRESSURE: 74 MMHG | BODY MASS INDEX: 28.82 KG/M2 | WEIGHT: 173 LBS

## 2025-08-07 DIAGNOSIS — E03.4 HYPOTHYROIDISM DUE TO ACQUIRED ATROPHY OF THYROID: ICD-10-CM

## 2025-08-07 DIAGNOSIS — Z52.4 DONOR OF KIDNEY FOR TRANSPLANT: ICD-10-CM

## 2025-08-07 DIAGNOSIS — Z90.5 H/O RIGHT NEPHRECTOMY: ICD-10-CM

## 2025-08-07 DIAGNOSIS — N18.32 STAGE 3B CHRONIC KIDNEY DISEASE (HCC): Primary | ICD-10-CM

## 2025-08-07 DIAGNOSIS — E55.9 VITAMIN D DEFICIENCY: ICD-10-CM

## 2025-08-07 LAB
25(OH)D3+25(OH)D2 SERPL-MCNC: 29.4 NG/ML (ref 30–100)
ALBUMIN/CREAT UR: <4 MG/G CREAT (ref 0–29)
BASOPHILS # BLD AUTO: 0 X10E3/UL (ref 0–0.2)
BASOPHILS NFR BLD AUTO: 1 %
BUN SERPL-MCNC: 13 MG/DL (ref 6–24)
BUN/CREAT SERPL: 9 (ref 9–23)
CALCIUM SERPL-MCNC: 9.6 MG/DL (ref 8.7–10.2)
CHLORIDE SERPL-SCNC: 101 MMOL/L (ref 96–106)
CO2 SERPL-SCNC: 21 MMOL/L (ref 20–29)
CREAT SERPL-MCNC: 1.52 MG/DL (ref 0.57–1)
CREAT UR-MCNC: 77.3 MG/DL
CYSTATIN C SERPL-MCNC: 1.36 MG/L (ref 0.67–1.14)
EGFR: 40 ML/MIN/1.73
EOSINOPHIL # BLD AUTO: 0 X10E3/UL (ref 0–0.4)
EOSINOPHIL NFR BLD AUTO: 1 %
ERYTHROCYTE [DISTWIDTH] IN BLOOD BY AUTOMATED COUNT: 13.1 % (ref 11.7–15.4)
GFR/BSA.PRED SERPLBLD CYS-BASED-ARV: 49 ML/MIN/1.73
GLUCOSE SERPL-MCNC: 86 MG/DL (ref 70–99)
HCT VFR BLD AUTO: 40.3 % (ref 34–46.6)
HGB BLD-MCNC: 13.6 G/DL (ref 11.1–15.9)
IMM GRANULOCYTES # BLD: 0 X10E3/UL (ref 0–0.1)
IMM GRANULOCYTES NFR BLD: 0 %
LYMPHOCYTES # BLD AUTO: 1.4 X10E3/UL (ref 0.7–3.1)
LYMPHOCYTES NFR BLD AUTO: 26 %
MAGNESIUM SERPL-MCNC: 2.1 MG/DL (ref 1.6–2.3)
MCH RBC QN AUTO: 29.8 PG (ref 26.6–33)
MCHC RBC AUTO-ENTMCNC: 33.7 G/DL (ref 31.5–35.7)
MCV RBC AUTO: 88 FL (ref 79–97)
MICROALBUMIN UR-MCNC: <3 UG/ML
MONOCYTES # BLD AUTO: 0.5 X10E3/UL (ref 0.1–0.9)
MONOCYTES NFR BLD AUTO: 9 %
NEUTROPHILS # BLD AUTO: 3.3 X10E3/UL (ref 1.4–7)
NEUTROPHILS NFR BLD AUTO: 63 %
PHOSPHATE SERPL-MCNC: 3.6 MG/DL (ref 3–4.3)
PLATELET # BLD AUTO: 264 X10E3/UL (ref 150–450)
POTASSIUM SERPL-SCNC: 4.8 MMOL/L (ref 3.5–5.2)
PTH-INTACT SERPL-MCNC: 65 PG/ML (ref 15–65)
RBC # BLD AUTO: 4.56 X10E6/UL (ref 3.77–5.28)
SODIUM SERPL-SCNC: 141 MMOL/L (ref 134–144)
WBC # BLD AUTO: 5.2 X10E3/UL (ref 3.4–10.8)

## 2025-08-07 PROCEDURE — 99214 OFFICE O/P EST MOD 30 MIN: CPT | Performed by: INTERNAL MEDICINE

## 2025-08-08 ENCOUNTER — RESULTS FOLLOW-UP (OUTPATIENT)
Dept: NEPHROLOGY | Facility: HOSPITAL | Age: 55
End: 2025-08-08

## 2025-08-14 ENCOUNTER — OFFICE VISIT (OUTPATIENT)
Age: 55
End: 2025-08-14
Payer: COMMERCIAL

## 2025-08-14 PROBLEM — E05.90 HYPERTHYROIDISM: Status: ACTIVE | Noted: 2023-11-14

## 2025-08-14 PROBLEM — Z86.0100 HISTORY OF COLONIC POLYPS: Status: ACTIVE | Noted: 2025-08-14

## (undated) DEVICE — INTENDED FOR TISSUE SEPARATION, AND OTHER PROCEDURES THAT REQUIRE A SHARP SURGICAL BLADE TO PUNCTURE OR CUT.: Brand: BARD-PARKER SAFETY BLADES SIZE 15, STERILE

## (undated) DEVICE — OCCLUDER COLPO-PNEUMO

## (undated) DEVICE — SUT MONOCRYL 4-0 PS-2 27 IN Y426H

## (undated) DEVICE — CHLORHEXIDINE 4PCT 4 OZ

## (undated) DEVICE — ARM DRAPE

## (undated) DEVICE — Device: Brand: TISSUE RETRIEVAL SYSTEM

## (undated) DEVICE — 40595 XL TRENDELENBURG POSITIONING KIT: Brand: 40595 XL TRENDELENBURG POSITIONING KIT

## (undated) DEVICE — VISUALIZATION SYSTEM: Brand: CLEARIFY

## (undated) DEVICE — ELECTRO LUBE IS A SINGLE PATIENT USE DEVICE THAT IS INTENDED TO BE USED ON ELECTROSURGICAL ELECTRODES TO REDUCE STICKING.: Brand: KEY SURGICAL ELECTRO LUBE

## (undated) DEVICE — GLOVE PI ULTRA TOUCH SZ.7.5

## (undated) DEVICE — TROCAR PORT ACCESS 5 X120MML W/BLDLS OPTICAL TIP AIRSEAL

## (undated) DEVICE — TRAY FOLEY 16FR URIMETER SILICONE SURESTEP

## (undated) DEVICE — SEAL

## (undated) DEVICE — HEMOSTATIC MATRIX SURGIFLO 8ML W/THROMBIN

## (undated) DEVICE — PROGRASP FORCEPS: Brand: ENDOWRIST

## (undated) DEVICE — SURGIFLO ENDOSCOPIC APPICATOR: Brand: ETHICON

## (undated) DEVICE — 1820 FOAM BLOCK NEEDLE COUNTER: Brand: DEVON

## (undated) DEVICE — ANTIBACTERIAL VIOLET BRAIDED (POLYGLACTIN 910), SYNTHETIC ABSORBABLE SUTURE: Brand: COATED VICRYL

## (undated) DEVICE — INTENDED FOR TISSUE SEPARATION, AND OTHER PROCEDURES THAT REQUIRE A SHARP SURGICAL BLADE TO PUNCTURE OR CUT.: Brand: BARD-PARKER SAFETY BLADES SIZE 11, STERILE

## (undated) DEVICE — KIT, BETHLEHEM ROBOTIC PROST: Brand: CARDINAL HEALTH

## (undated) DEVICE — GLOVE INDICATOR PI UNDERGLOVE SZ 7 BLUE

## (undated) DEVICE — GLOVE INDICATOR PI UNDERGLOVE SZ 8 BLUE

## (undated) DEVICE — COLUMN DRAPE

## (undated) DEVICE — ENSEAL X1 TISSUE SEALER, CURVED JAW, 37 CM SHAFT LENGTH: Brand: ENSEAL

## (undated) DEVICE — SYRINGE 50ML LL

## (undated) DEVICE — SUT STRATAFIX SPIRAL 2-0 CT-1 30 CM SXPP1B410

## (undated) DEVICE — MONOPOLAR CURVED SCISSORS: Brand: ENDOWRIST

## (undated) DEVICE — TIP COVER ACCESSORY

## (undated) DEVICE — EXOFIN PRECISION PEN HIGH VISCOSITY TOPICAL SKIN ADHESIVE: Brand: EXOFIN PRECISION PEN, 1G

## (undated) DEVICE — FENESTRATED BIPOLAR FORCEPS: Brand: ENDOWRIST

## (undated) DEVICE — PAD GROUNDING DUAL ADULT

## (undated) DEVICE — BLADELESS OBTURATOR: Brand: WECK VISTA

## (undated) DEVICE — AIRSEAL TUBE SMOKE EVAC LUMENX3 FILTERED

## (undated) DEVICE — LARGE NEEDLE DRIVER: Brand: ENDOWRIST

## (undated) DEVICE — PREMIUM DRY TRAY LF: Brand: MEDLINE INDUSTRIES, INC.